# Patient Record
Sex: FEMALE | Race: ASIAN | Employment: STUDENT | URBAN - METROPOLITAN AREA
[De-identification: names, ages, dates, MRNs, and addresses within clinical notes are randomized per-mention and may not be internally consistent; named-entity substitution may affect disease eponyms.]

---

## 2017-05-16 ENCOUNTER — HOSPITAL ENCOUNTER (INPATIENT)
Facility: CLINIC | Age: 22
LOS: 2 days | Discharge: HOME | DRG: 885 | End: 2017-05-18
Attending: PSYCHIATRY & NEUROLOGY | Admitting: PSYCHIATRY & NEUROLOGY
Payer: COMMERCIAL

## 2017-05-16 DIAGNOSIS — F33.1 MAJOR DEPRESSIVE DISORDER, RECURRENT EPISODE, MODERATE (H): ICD-10-CM

## 2017-05-16 DIAGNOSIS — F60.3 BORDERLINE PERSONALITY DISORDER (H): ICD-10-CM

## 2017-05-16 DIAGNOSIS — T40.2X1A OPIOID OVERDOSE, ACCIDENTAL OR UNINTENTIONAL, INITIAL ENCOUNTER (H): Primary | ICD-10-CM

## 2017-05-16 DIAGNOSIS — F43.10 PTSD (POST-TRAUMATIC STRESS DISORDER): ICD-10-CM

## 2017-05-16 LAB
AMPHETAMINES UR QL SCN: ABNORMAL
BARBITURATES UR QL: ABNORMAL
BENZODIAZ UR QL: ABNORMAL
CANNABINOIDS UR QL SCN: ABNORMAL
COCAINE UR QL: ABNORMAL
ETHANOL UR QL SCN: ABNORMAL
HCG UR QL: NEGATIVE
OPIATES UR QL SCN: ABNORMAL

## 2017-05-16 PROCEDURE — 12400001 ZZH R&B MH UMMC

## 2017-05-16 PROCEDURE — 99285 EMERGENCY DEPT VISIT HI MDM: CPT | Mod: 25 | Performed by: PSYCHIATRY & NEUROLOGY

## 2017-05-16 PROCEDURE — 25000132 ZZH RX MED GY IP 250 OP 250 PS 637: Performed by: PSYCHIATRY & NEUROLOGY

## 2017-05-16 PROCEDURE — 81025 URINE PREGNANCY TEST: CPT | Performed by: EMERGENCY MEDICINE

## 2017-05-16 PROCEDURE — 80320 DRUG SCREEN QUANTALCOHOLS: CPT | Performed by: EMERGENCY MEDICINE

## 2017-05-16 PROCEDURE — 80307 DRUG TEST PRSMV CHEM ANLYZR: CPT | Performed by: EMERGENCY MEDICINE

## 2017-05-16 PROCEDURE — 99285 EMERGENCY DEPT VISIT HI MDM: CPT | Mod: Z6 | Performed by: PSYCHIATRY & NEUROLOGY

## 2017-05-16 RX ORDER — OXYBUTYNIN CHLORIDE 5 MG/1
5 TABLET, EXTENDED RELEASE ORAL AT BEDTIME
Status: DISCONTINUED | OUTPATIENT
Start: 2017-05-16 | End: 2017-05-18 | Stop reason: HOSPADM

## 2017-05-16 RX ORDER — IBUPROFEN 200 MG
400-600 TABLET ORAL EVERY 4 HOURS PRN
COMMUNITY
End: 2019-05-04

## 2017-05-16 RX ORDER — IPRATROPIUM BROMIDE AND ALBUTEROL SULFATE 2.5; .5 MG/3ML; MG/3ML
3 SOLUTION RESPIRATORY (INHALATION) ONCE
Status: DISCONTINUED | OUTPATIENT
Start: 2017-05-16 | End: 2017-05-16 | Stop reason: CLARIF

## 2017-05-16 RX ORDER — NICOTINE 21 MG/24HR
1 PATCH, TRANSDERMAL 24 HOURS TRANSDERMAL DAILY
Status: DISCONTINUED | OUTPATIENT
Start: 2017-05-17 | End: 2017-05-18 | Stop reason: HOSPADM

## 2017-05-16 RX ORDER — BISACODYL 10 MG
10 SUPPOSITORY, RECTAL RECTAL DAILY PRN
Status: DISCONTINUED | OUTPATIENT
Start: 2017-05-16 | End: 2017-05-18 | Stop reason: HOSPADM

## 2017-05-16 RX ORDER — ALBUTEROL SULFATE 90 UG/1
2 AEROSOL, METERED RESPIRATORY (INHALATION) DAILY
Status: DISCONTINUED | OUTPATIENT
Start: 2017-05-17 | End: 2017-05-18 | Stop reason: HOSPADM

## 2017-05-16 RX ORDER — IBUPROFEN 200 MG
400-600 TABLET ORAL EVERY 4 HOURS PRN
Status: DISCONTINUED | OUTPATIENT
Start: 2017-05-16 | End: 2017-05-18 | Stop reason: HOSPADM

## 2017-05-16 RX ORDER — OLANZAPINE 5 MG/1
5-10 TABLET ORAL
Status: DISCONTINUED | OUTPATIENT
Start: 2017-05-16 | End: 2017-05-18 | Stop reason: HOSPADM

## 2017-05-16 RX ORDER — OLANZAPINE 10 MG/2ML
5-10 INJECTION, POWDER, FOR SOLUTION INTRAMUSCULAR
Status: DISCONTINUED | OUTPATIENT
Start: 2017-05-16 | End: 2017-05-18 | Stop reason: HOSPADM

## 2017-05-16 RX ORDER — ALUMINA, MAGNESIA, AND SIMETHICONE 2400; 2400; 240 MG/30ML; MG/30ML; MG/30ML
30 SUSPENSION ORAL EVERY 4 HOURS PRN
Status: DISCONTINUED | OUTPATIENT
Start: 2017-05-16 | End: 2017-05-18 | Stop reason: HOSPADM

## 2017-05-16 RX ORDER — HYDROXYZINE HYDROCHLORIDE 25 MG/1
25-50 TABLET, FILM COATED ORAL EVERY 4 HOURS PRN
Status: DISCONTINUED | OUTPATIENT
Start: 2017-05-16 | End: 2017-05-18 | Stop reason: HOSPADM

## 2017-05-16 RX ORDER — TRAZODONE HYDROCHLORIDE 50 MG/1
50 TABLET, FILM COATED ORAL
Status: DISCONTINUED | OUTPATIENT
Start: 2017-05-16 | End: 2017-05-18 | Stop reason: HOSPADM

## 2017-05-16 RX ADMIN — OXYBUTYNIN CHLORIDE 5 MG: 5 TABLET, EXTENDED RELEASE ORAL at 21:36

## 2017-05-16 RX ADMIN — TRAZODONE HYDROCHLORIDE 50 MG: 50 TABLET ORAL at 21:36

## 2017-05-16 ASSESSMENT — ENCOUNTER SYMPTOMS
HALLUCINATIONS: 0
DYSPHORIC MOOD: 1
NERVOUS/ANXIOUS: 1
SHORTNESS OF BREATH: 0
FEVER: 0
BACK PAIN: 0
CHEST TIGHTNESS: 0
ABDOMINAL PAIN: 0
DIZZINESS: 0

## 2017-05-16 ASSESSMENT — ACTIVITIES OF DAILY LIVING (ADL)
COGNITION: 0 - NO COGNITION ISSUES REPORTED
BATHING: 0-->INDEPENDENT
TRANSFERRING: 0-->INDEPENDENT
RETIRED_COMMUNICATION: 0-->UNDERSTANDS/COMMUNICATES WITHOUT DIFFICULTY
RETIRED_EATING: 0-->INDEPENDENT
DRESS: 0-->INDEPENDENT
AMBULATION: 0-->INDEPENDENT
TOILETING: 0-->INDEPENDENT
SWALLOWING: 0-->SWALLOWS FOODS/LIQUIDS WITHOUT DIFFICULTY
FALL_HISTORY_WITHIN_LAST_SIX_MONTHS: NO

## 2017-05-16 NOTE — IP AVS SNAPSHOT
[facility]    [facility]    [address] [facility] [address]    Phone:  [phone]                                       After Visit Summary   5/16/2017    Lily San    MRN: [id number]           After Visit Summary Signature Page     I have received my discharge instructions, and my questions have been answered. I have discussed any challenges I see with this plan with the nurse or doctor.    ..........................................................................................................................................  Patient/Patient Representative Signature      ..........................................................................................................................................  Patient Representative Print Name and Relationship to Patient    ..................................................               ................................................  Date                                            Time    ..........................................................................................................................................  Reviewed by Signature/Title    ...................................................              ..............................................  Date                                                            Time

## 2017-05-16 NOTE — IP AVS SNAPSHOT
MRN:[id number]                      After Visit Summary   5/16/2017    Lily San    MRN: [id number]           Patient Information     Date Of Birth          1995        Designated Caregiver       Most Recent Value    Caregiver    Will someone help with your care after discharge? yes    Name of designated caregiver Ed    Phone number of caregiver look in chart    Caregiver address Look in chart      About your hospital stay     You were admitted on:  May 16, 2017 You last received care in the:  [facility]    You were discharged on:  May 18, 2017       Who to Call     For medical emergencies, please call 911.  For non-urgent questions about your medical care, please call your primary care provider or clinic, [phone]          Attending Provider     Provider Specialty    Adrian Stiles MD Emergency Medicine    [facility]       Primary Care Provider Office Phone # Fax #    [facility] [phone]       [address] [place] [address]  [facility] [address]        Further instructions from your care team       Behavioral Discharge Planning and Instructions      Summary:  You were admitted on 5/16/2017  For Suicidal Ideations.  You were treated by Debra Naegele, APRN, CNS and discharged on 05/18/2017 from Station [facility].    Main Diagnosis:   1. Major depressive disorder, recurrent, moderate.   2. Borderline personality disorder.   3. Opioid use disorder severe.   4. Cannabis use disorder, severe.   5. Posttraumatic stress disorder.   6. Bulimia nervosa in remission  7. Urinary incontinence, unspecified type    Health Care Follow-up Appointments:   Medication Management  Provider:  [facility]  [address]  [address]  Phone: [phone]  Fax: [phone]  Appointment: Tuesday, June 6 at 2:30 pm    No therapy appointment was set up because you  declined therapy services.    Attend all scheduled appointments with your outpatient providers. Call at least 24 hours in advance if you need to reschedule an appointment to ensure continued access to your outpatient providers.   Major Treatments, Procedures and Findings:  You were provided with: a psychiatric assessment, medication evaluation and/or management and milieu management    Symptoms to Report: feeling more aggressive, increased confusion, losing more sleep, mood getting worse or thoughts of suicide    Early warning signs can include: increased depression or anxiety sleep disturbances increased thoughts or behaviors of suicide or self-harm  increased unusual thinking, such as paranoia or hearing voices    Safety and Wellness:  Take all medicines as directed.  Make no changes unless your doctor suggests them. Follow treatment recommendations.  Refrain from alcohol and non-prescribed drugs.  If there is a concern for safety, call [phone].    Resources:   Crisis Intervention: 978.164.7568 or 753-883-5829 (TTY: 560.116.2731).  Call anytime for help.  National Star on Mental Illness (www.[place].zuleyka.org): 814.751.9289 or 915-610-6901.  [place] Association for Children's Mental Health (www.macmh.org): 478.942.5814.  Suicide Awareness Voices of Education (SAVE) (www.save.org): 352-118-RGTX (9573)  National Suicide Prevention Line (www.mentalhealthmn.org): 919-566-WVAQ (1537)  Mental Health Consumer/Survivor Network of [place] (www.mhcsn.net): 506.387.6873 or 969-658-8075  Mental Health Association of [place] (www.mentalhealth.org): 222.751.9970 or 061-883-4989    The treatment team has appreciated the opportunity to work with you.     If you have any questions or concerns our unit number is [phone]  You may be receiving a follow-up phone call within the next three days from a representative from behavioral health.    You have identified the best phone number to reach you as [phone]        Pending Results     No orders  "found from 2017 to 2017.            Admission Information     Date & Time Department Dept. Phone    2017 [facility] [phone]      Your Vitals Were     Blood Pressure Pulse Temperature Respirations Weight Last Period    117/68 78 98.2  F (36.8  C) (Oral) 16 67.4 kg (148 lb 9.6 oz) 2017    Pulse Oximetry BMI (Body Mass Index)                95% 28.08 kg/m2          [employer] Information     [employer] lets you send messages to your doctor, view your test results, renew your prescriptions, schedule appointments and more. To sign up, go to [email or web address] . Click on \"Log in\" on the left side of the screen, which will take you to the Welcome page. Then click on \"Sign up Now\" on the right side of the page.     You will be asked to enter the access code listed below, as well as some personal information. Please follow the directions to create your username and password.     Your access code is: [email or web address]  Expires: 2017  1:47 PM     Your access code will  in 90 days. If you need help or a new code, please call your [facility] clinic or [phone].        Care EveryWhere ID     This is your Care EveryWhere ID. This could be used by other organizations to access your [facility] medical records  [id number]           Review of your medicines      START taking        Dose / Directions    naloxone nasal spray   Commonly known as:  NARCAN   Used for:  Opioid overdose, accidental or unintentional, initial encounter        Dose:  4 mg   Spray 1 spray (4 mg) into one nostril alternating nostrils as needed for opioid reversal (every 2-3 minutes until medical assistance arrives.)   Quantity:  0.2 mL   Refills:  1         CONTINUE these medicines which may have CHANGED, or have new prescriptions. If we are uncertain of the size of tablets/capsules you have at home, strength may be listed as something that might have changed.        Dose / Directions    methadone 5 MG/5ML " solution   Commonly known as:  DOLPHINE   This may have changed:  how much to take   Used for:  Opioid dependence with withdrawal (H)        Dose:  90 mg   Take 90 mLs (90 mg) by mouth daily   Refills:  0       oxybutynin 5 MG 24 hr tablet   Commonly known as:  DITROPAN-XL   This may have changed:  when to take this   Used for:  Urinary incontinence, unspecified incontinence type        Dose:  5 mg   Take 1 tablet (5 mg) by mouth At Bedtime   Quantity:  30 tablet   Refills:  1       risperiDONE 1 MG tablet   Commonly known as:  risperDAL   This may have changed:  when to take this   Used for:  Major depressive disorder, recurrent episode, moderate (H)        Dose:  1 mg   Take 1 tablet (1 mg) by mouth At Bedtime   Quantity:  60 tablet   Refills:  1         CONTINUE these medicines which have NOT CHANGED        Dose / Directions    albuterol 108 (90 BASE) MCG/ACT Inhaler   Commonly known as:  PROAIR HFA/PROVENTIL HFA/VENTOLIN HFA   Used for:  Intermittent asthma, with acute exacerbation        Dose:  2 puff   Inhale 2 puffs into the lungs every 6 hours as needed for shortness of breath / dyspnea or wheezing   Quantity:  1 Inhaler   Refills:  0       DULoxetine 30 MG EC capsule   Commonly known as:  CYMBALTA   Used for:  Major depressive disorder, recurrent episode, moderate (H)        Dose:  30 mg   Take 1 capsule (30 mg) by mouth 2 times daily   Quantity:  60 capsule   Refills:  1       fluticasone 100 MCG/BLIST Aepb   Commonly known as:  FLOVENT DISKUS   Used for:  Intermittent asthma, well controlled        Dose:  1 puff   Inhale 1 puff (100 mcg) into the lungs 2 times daily   Quantity:  60 each   Refills:  1       ibuprofen 200 MG tablet   Commonly known as:  ADVIL/MOTRIN        Dose:  400-600 mg   Take 400-600 mg by mouth every 4 hours as needed for mild pain   Refills:  0       traZODone 100 MG tablet   Commonly known as:  DESYREL   Used for:  Major depressive disorder, recurrent episode, moderate (H)         Dose:  200 mg   Take 2 tablets (200 mg) by mouth At Bedtime   Quantity:  60 tablet   Refills:  1            Where to get your medicines      These medications were sent to [facility] [place], [place] - [address]  [address], [facility] [address]     Phone:  [phone]     DULoxetine 30 MG EC capsule    risperiDONE 1 MG tablet         Some of these will need a paper prescription and others can be bought over the counter. Ask your nurse if you have questions.     Bring a paper prescription for each of these medications     naloxone nasal spray                Protect others around you: Learn how to safely use, store and throw away your medicines at www.disposemymeds.org.             Medication List: This is a list of all your medications and when to take them. Check marks below indicate your daily home schedule. Keep this list as a reference.      Medications           Morning Afternoon Evening Bedtime As Needed    albuterol 108 (90 BASE) MCG/ACT Inhaler   Commonly known as:  PROAIR HFA/PROVENTIL HFA/VENTOLIN HFA   Inhale 2 puffs into the lungs every 6 hours as needed for shortness of breath / dyspnea or wheezing   Last time this was given:  2 puffs on 5/18/2017  9:14 AM                                DULoxetine 30 MG EC capsule   Commonly known as:  CYMBALTA   Take 1 capsule (30 mg) by mouth 2 times daily   Last time this was given:  30 mg on 5/18/2017  9:13 AM                                fluticasone 100 MCG/BLIST Aepb   Commonly known as:  FLOVENT DISKUS   Inhale 1 puff (100 mcg) into the lungs 2 times daily                                ibuprofen 200 MG tablet   Commonly known as:  ADVIL/MOTRIN   Take 400-600 mg by mouth every 4 hours as needed for mild pain                                methadone 5 MG/5ML solution   Commonly known as:  DOLPHINE   Take 90 mLs (90 mg) by mouth daily                                naloxone nasal spray   Commonly known as:  NARCAN   Spray 1  spray (4 mg) into one nostril alternating nostrils as needed for opioid reversal (every 2-3 minutes until medical assistance arrives.)                                oxybutynin 5 MG 24 hr tablet   Commonly known as:  DITROPAN-XL   Take 1 tablet (5 mg) by mouth At Bedtime   Last time this was given:  5 mg on 5/17/2017 10:03 PM                                risperiDONE 1 MG tablet   Commonly known as:  risperDAL   Take 1 tablet (1 mg) by mouth At Bedtime   Last time this was given:  1 mg on 5/17/2017 10:03 PM                                traZODone 100 MG tablet   Commonly known as:  DESYREL   Take 2 tablets (200 mg) by mouth At Bedtime   Last time this was given:  200 mg on 5/17/2017 10:03 PM

## 2017-05-16 NOTE — ED NOTES
Has had increasing suicidal thoughts over the past week.  Feels she may nt be able to keep herself safe.  Today, had plan to use carbon monoxide.  Also reports increased thoughts of self harm by cutting.  Has not acted on these thoughts.

## 2017-05-16 NOTE — ED PROVIDER NOTES
History     Chief Complaint   Patient presents with     Suicidal     increase in past week off and on.       The history is provided by the patient, medical records and a parent.     Lily San is a 21 year old female who comes in due to her worsening suicidal thoughts.  She admits that she has suicidal thoughts all the time but over the last week they have become stronger and more intense.  She no longer feels she can be safe.  She has thoughts of carbon monoxide poisoning.  She has done this in the past.  She has had several hospitalization (up to 20).  She has been in DBT and day treatment in the past but nothing current other than a medication provider.  She has stopped her medications the last week.  She could not give a reason for why she stopped them. She is also using daily marijuana in order to stop the cravings for heroin.  Her last heroin was 2 weeks ago.      I have reviewed the Medications, Allergies, Past Medical and Surgical History, and Social History in the Epic system.    Review of Systems   Constitutional: Negative for fever.   Eyes: Negative for visual disturbance.   Respiratory: Negative for chest tightness and shortness of breath.    Cardiovascular: Negative for chest pain.   Gastrointestinal: Negative for abdominal pain.   Musculoskeletal: Negative for back pain.   Neurological: Negative for dizziness.   Psychiatric/Behavioral: Positive for dysphoric mood and suicidal ideas. Negative for hallucinations and self-injury. The patient is nervous/anxious.    All other systems reviewed and are negative.      Physical Exam   BP: 127/55  Pulse: 74  Temp: 97.6  F (36.4  C)  Resp: 16  SpO2: 95 %  Physical Exam   Constitutional: She is oriented to person, place, and time. She appears well-developed and well-nourished.   HENT:   Head: Normocephalic and atraumatic.   Mouth/Throat: Oropharynx is clear and moist.   Eyes: Pupils are equal, round, and reactive to light.   Neck: Normal range of  motion. Neck supple.   Cardiovascular: Normal rate, regular rhythm and normal heart sounds.    Pulmonary/Chest: Effort normal and breath sounds normal.   Abdominal: Soft. Bowel sounds are normal.   Musculoskeletal: Normal range of motion.   Neurological: She is alert and oriented to person, place, and time.   Skin: Skin is warm and dry.   Psychiatric: Her speech is normal and behavior is normal. She is not actively hallucinating. Thought content is not paranoid and not delusional. Cognition and memory are normal. She expresses impulsivity and inappropriate judgment. She exhibits a depressed mood. She expresses suicidal ideation. She expresses no homicidal ideation. She expresses suicidal plans. She expresses no homicidal plans.   Lily is a 20 y/o female who looks her age.  She is well groomed with good eye contact.   Nursing note and vitals reviewed.      ED Course     ED Course     Procedures               Labs Ordered and Resulted from Time of ED Arrival Up to the Time of Departure from the ED   DRUG ABUSE SCREEN 6 CHEM DEP URINE ([facility])   HCG QUALITATIVE URINE            Assessments & Plan (with Medical Decision Making)   Lily will be admitted to the hospital due to her not taking her meds, using marijuana, having suicidal thoughts with a plan with past attempts and not feeling she can stay safe.  She will go to station 4a under Dr. Mar.    I have reviewed the nursing notes.    I have reviewed the findings, diagnosis, plan and need for follow up with the patient.    New Prescriptions    No medications on file       Final diagnoses:   Borderline personality disorder   PTSD (post-traumatic stress disorder)   Major depressive disorder, recurrent episode, moderate (H)       5/16/2017   [facility], EMERGENCY DEPARTMENT     Adrian Stiles MD  05/16/17 3204

## 2017-05-16 NOTE — PHARMACY-ADMISSION MEDICATION HISTORY
Admission Medication History status for the 5/16/2017 admission is complete.  See EPIC admission navigator for Prior to Admission medications.    Medication history sources:  Patient, Patient's mother/legal guardian     Medication history source reliability: Good    Medication adherence:  Poor    Changes made to PTA medication list (reason)  Added: Ibuprofen  Deleted: None  Changed: Oxybutynin (from taking once at bedtime to in the morning, per patient), methadone from 90 mg daily to 96 mg daily, Risperdal 1 mg BID to 1 mg daily    Additional medication history information (including reliability of information, actions taken by pharmacist):   -Patient was a good historian and responded to questions coherently without any difficulty.  -Patient has not taken any of her medications, except for methadone, within the past 1-2 weeks for no apparent reason.  -Patient states she takes methadone 96 mg once daily and took her last dose today (5/16) around 12:20PM. She also says [facility] manages her methadone regimen. I was unable to confirm this dose with the clinic (tel. # [phone]) due to the clinic being closed.    Time spent in this activity: 20 minutes    Medication history completed by: ALVARO Juárez Pharmacy Intern    Prior to Admission medications    Medication Sig Last Dose Taking? Auth Provider   ibuprofen (ADVIL/MOTRIN) 200 MG tablet Take 400-600 mg by mouth every 4 hours as needed for mild pain Past Month at Unknown time Yes Unknown, Entered By History   methadone (DOLPHINE) 5 MG/5ML solution Take 90 mLs (90 mg) by mouth daily  Patient taking differently: Take 96 mg by mouth daily  5/16/2017 at 1200 Yes Benedict Mar MD   fluticasone (FLOVENT DISKUS) 100 MCG/BLIST AEPB Inhale 1 puff (100 mcg) into the lungs 2 times daily Past Month at Unknown time Yes Benedict Mar MD   DULoxetine (CYMBALTA) 30 MG capsule Take 1 capsule (30 mg) by mouth 2  times daily Past Week at Unknown time Yes Benedict Mar MD   traZODone (DESYREL) 100 MG tablet Take 2 tablets (200 mg) by mouth At Bedtime Past Month at Unknown time Yes Benedict Mar MD   risperiDONE (RISPERDAL) 1 MG tablet Take 1 tablet (1 mg) by mouth 2 times daily  Patient taking differently: Take 1 mg by mouth daily  Past Week at Unknown time Yes Benedict Mar MD   oxybutynin (DITROPAN-XL) 5 MG 24 hr tablet Take 1 tablet (5 mg) by mouth At Bedtime  Patient taking differently: Take 5 mg by mouth every morning  Past Month at Unknown time Yes Benedict Mar MD   albuterol (PROAIR HFA, PROVENTIL HFA, VENTOLIN HFA) 108 (90 BASE) MCG/ACT inhaler Inhale 2 puffs into the lungs every 6 hours as needed for shortness of breath / dyspnea or wheezing Past Week at Unknown time Yes Александр Florentino MD

## 2017-05-17 LAB
ALBUMIN SERPL-MCNC: 4 G/DL (ref 3.4–5)
ALP SERPL-CCNC: 108 U/L (ref 40–150)
ALT SERPL W P-5'-P-CCNC: 28 U/L (ref 0–50)
ANION GAP SERPL CALCULATED.3IONS-SCNC: 8 MMOL/L (ref 3–14)
AST SERPL W P-5'-P-CCNC: 22 U/L (ref 0–45)
BILIRUB SERPL-MCNC: 0.4 MG/DL (ref 0.2–1.3)
BUN SERPL-MCNC: 8 MG/DL (ref 7–30)
CALCIUM SERPL-MCNC: 9.3 MG/DL (ref 8.5–10.1)
CHLORIDE SERPL-SCNC: 107 MMOL/L (ref 94–109)
CHOLEST SERPL-MCNC: 185 MG/DL
CO2 SERPL-SCNC: 26 MMOL/L (ref 20–32)
CREAT SERPL-MCNC: 0.79 MG/DL (ref 0.52–1.04)
ERYTHROCYTE [DISTWIDTH] IN BLOOD BY AUTOMATED COUNT: 12.2 % (ref 10–15)
GFR SERPL CREATININE-BSD FRML MDRD: NORMAL ML/MIN/1.7M2
GLUCOSE SERPL-MCNC: 95 MG/DL (ref 70–99)
HCT VFR BLD AUTO: 44.3 % (ref 35–47)
HDLC SERPL-MCNC: 43 MG/DL
HGB BLD-MCNC: 15.1 G/DL (ref 11.7–15.7)
LDLC SERPL CALC-MCNC: 102 MG/DL
MCH RBC QN AUTO: 30.6 PG (ref 26.5–33)
MCHC RBC AUTO-ENTMCNC: 34.1 G/DL (ref 31.5–36.5)
MCV RBC AUTO: 90 FL (ref 78–100)
NONHDLC SERPL-MCNC: 142 MG/DL
PLATELET # BLD AUTO: 326 10E9/L (ref 150–450)
POTASSIUM SERPL-SCNC: 3.8 MMOL/L (ref 3.4–5.3)
PROT SERPL-MCNC: 7.8 G/DL (ref 6.8–8.8)
RBC # BLD AUTO: 4.94 10E12/L (ref 3.8–5.2)
SODIUM SERPL-SCNC: 141 MMOL/L (ref 133–144)
TRIGL SERPL-MCNC: 202 MG/DL
TSH SERPL DL<=0.005 MIU/L-ACNC: 3.24 MU/L (ref 0.4–4)
WBC # BLD AUTO: 7.3 10E9/L (ref 4–11)

## 2017-05-17 PROCEDURE — 36415 COLL VENOUS BLD VENIPUNCTURE: CPT | Performed by: PSYCHIATRY & NEUROLOGY

## 2017-05-17 PROCEDURE — 25000125 ZZHC RX 250: Performed by: CLINICAL NURSE SPECIALIST

## 2017-05-17 PROCEDURE — 85027 COMPLETE CBC AUTOMATED: CPT | Performed by: PSYCHIATRY & NEUROLOGY

## 2017-05-17 PROCEDURE — 25000132 ZZH RX MED GY IP 250 OP 250 PS 637: Performed by: CLINICAL NURSE SPECIALIST

## 2017-05-17 PROCEDURE — 90853 GROUP PSYCHOTHERAPY: CPT

## 2017-05-17 PROCEDURE — 80053 COMPREHEN METABOLIC PANEL: CPT | Performed by: PSYCHIATRY & NEUROLOGY

## 2017-05-17 PROCEDURE — 25000132 ZZH RX MED GY IP 250 OP 250 PS 637: Performed by: PSYCHIATRY & NEUROLOGY

## 2017-05-17 PROCEDURE — 84443 ASSAY THYROID STIM HORMONE: CPT | Performed by: PSYCHIATRY & NEUROLOGY

## 2017-05-17 PROCEDURE — 80061 LIPID PANEL: CPT | Performed by: PSYCHIATRY & NEUROLOGY

## 2017-05-17 PROCEDURE — 97150 GROUP THERAPEUTIC PROCEDURES: CPT | Mod: GO

## 2017-05-17 PROCEDURE — 12400001 ZZH R&B MH UMMC

## 2017-05-17 PROCEDURE — 99222 1ST HOSP IP/OBS MODERATE 55: CPT | Mod: AI | Performed by: CLINICAL NURSE SPECIALIST

## 2017-05-17 RX ORDER — METHADONE HYDROCHLORIDE 10 MG/ML
96 CONCENTRATE ORAL DAILY
Status: DISCONTINUED | OUTPATIENT
Start: 2017-05-17 | End: 2017-05-18 | Stop reason: HOSPADM

## 2017-05-17 RX ORDER — RISPERIDONE 1 MG/1
1 TABLET ORAL AT BEDTIME
Status: DISCONTINUED | OUTPATIENT
Start: 2017-05-17 | End: 2017-05-18 | Stop reason: HOSPADM

## 2017-05-17 RX ORDER — METHADONE HYDROCHLORIDE 5 MG/5ML
96 SOLUTION ORAL ONCE
Status: DISCONTINUED | OUTPATIENT
Start: 2017-05-17 | End: 2017-05-17 | Stop reason: CLARIF

## 2017-05-17 RX ORDER — TRAZODONE HYDROCHLORIDE 100 MG/1
200 TABLET ORAL AT BEDTIME
Status: DISCONTINUED | OUTPATIENT
Start: 2017-05-17 | End: 2017-05-18 | Stop reason: HOSPADM

## 2017-05-17 RX ORDER — NALOXONE HYDROCHLORIDE 0.4 MG/ML
.1-.4 INJECTION, SOLUTION INTRAMUSCULAR; INTRAVENOUS; SUBCUTANEOUS
Status: DISCONTINUED | OUTPATIENT
Start: 2017-05-17 | End: 2017-05-18 | Stop reason: HOSPADM

## 2017-05-17 RX ORDER — METHADONE HYDROCHLORIDE 5 MG/5ML
96 SOLUTION ORAL DAILY
Status: DISCONTINUED | OUTPATIENT
Start: 2017-05-18 | End: 2017-05-17 | Stop reason: CLARIF

## 2017-05-17 RX ORDER — ONDANSETRON 4 MG/1
4 TABLET, ORALLY DISINTEGRATING ORAL EVERY 6 HOURS PRN
Status: DISCONTINUED | OUTPATIENT
Start: 2017-05-17 | End: 2017-05-18 | Stop reason: HOSPADM

## 2017-05-17 RX ORDER — DULOXETIN HYDROCHLORIDE 30 MG/1
30 CAPSULE, DELAYED RELEASE ORAL 2 TIMES DAILY
Status: DISCONTINUED | OUTPATIENT
Start: 2017-05-17 | End: 2017-05-18 | Stop reason: HOSPADM

## 2017-05-17 RX ADMIN — HYDROXYZINE HYDROCHLORIDE 50 MG: 25 TABLET ORAL at 12:25

## 2017-05-17 RX ADMIN — ALBUTEROL SULFATE 2 PUFF: 90 AEROSOL, METERED RESPIRATORY (INHALATION) at 11:35

## 2017-05-17 RX ADMIN — OXYBUTYNIN CHLORIDE 5 MG: 5 TABLET, EXTENDED RELEASE ORAL at 22:03

## 2017-05-17 RX ADMIN — RISPERIDONE 1 MG: 1 TABLET ORAL at 22:03

## 2017-05-17 RX ADMIN — DULOXETINE 30 MG: 30 CAPSULE, DELAYED RELEASE ORAL at 22:03

## 2017-05-17 RX ADMIN — METHADONE HYDROCHLORIDE 96 MG: 10 CONCENTRATE ORAL at 13:48

## 2017-05-17 RX ADMIN — TRAZODONE HYDROCHLORIDE 200 MG: 100 TABLET ORAL at 22:03

## 2017-05-17 RX ADMIN — ONDANSETRON 4 MG: 4 TABLET, ORALLY DISINTEGRATING ORAL at 11:34

## 2017-05-17 RX ADMIN — NICOTINE 1 PATCH: 14 PATCH TRANSDERMAL at 11:36

## 2017-05-17 ASSESSMENT — ACTIVITIES OF DAILY LIVING (ADL)
HYGIENE/GROOMING: INDEPENDENT
ORAL_HYGIENE: INDEPENDENT
HYGIENE/GROOMING: INDEPENDENT
ORAL_HYGIENE: INDEPENDENT
DRESS: INDEPENDENT
DRESS: INDEPENDENT
LAUNDRY: UNABLE TO COMPLETE

## 2017-05-17 NOTE — PLAN OF CARE
Problem: General Plan of Care (Inpatient Behavioral)  Goal: Individualization/Patient Specific Goal (IP Behavioral)  The patient and/or their representative will achieve their patient-specific goals related to the plan of care.    The patient-specific goals include: Illness Management Recovery model: Objectives  Patient will identify reason(s) for hospitalization from their perspective.  Patient will identify a minimum of three goals for discharge.  Patient will identify a minimum of three triggers that may increase their symptoms.  Patient will identify a minimum of three coping skills they can do to stay well.   Patient will identify their support system to demonstrate readiness for discharge.    Illness Management & Recovery assists patient to develop relapse prevention as  patient identifies triggers for relapse.  patient identifies a general wellness strategy.  patient identifies the warning signs that they are in danger of relapse.  patient identifies someone they count on to get feedback .  patient identifies ways to take action when in danger of relapse.  patient identifies way to cope with stress or other symptoms.   patient participates in self-reflection.   The patient completed the reasons for admit and goals for discharge in the personal plan of care.   Reasons for admit:  1)  Suicidal ideation  2)  Not taking meds  3)  Mental health  4)  Feeling unsafe     Goals for discharge:  1)  Stabilize back on meds  2)  Feel better and not have suicidal ideation  3)  Be honest about how I'm doing

## 2017-05-17 NOTE — PROGRESS NOTES
Pt was awake for vitals and breakfast but complained of feeling sick to her stomach.  Pt vomited multiple times though out the shift.  Pt's nurse was informed and followed up immediatly.      Pt did attempt to eat breakfast but continued to feel ill.       05/17/17 1200   Behavioral Health   Hallucinations denies / not responding to hallucinations   Thinking distractable   Orientation person: oriented;place: oriented;date: oriented   Memory baseline memory   Insight admits / accepts   Judgement impaired   Eye Contact at examiner   Affect blunted, flat   Mood mood is calm   Physical Appearance/Attire attire appropriate to age and situation   Suicidality (none stated nor observed)   Self Injury (none stated nor observed)   Speech clear;coherent   Psychomotor / Gait balanced;steady   Activities of Daily Living   Hygiene/Grooming independent   Oral Hygiene independent   Dress independent   Room Organization independent

## 2017-05-17 NOTE — PROGRESS NOTES
Attendence: Pt. Attended scheduled 3 of 3 OT sessions today.   Observations: Pt had positive participation and contributed to group discussion.      05/17/17 1500   Occupational Therapy   Type of Intervention structured groups   Response Participates   Hours 2.5

## 2017-05-17 NOTE — PROGRESS NOTES
Patient arrived to the unit escorted by security and staff.  Two female nurses searched the patient. Patient personal items were placed in the back, pending safety search.  After search was completed, the patients mother was let in to visit. PAULINA signed for mom. Folder is reviewed with patient and parent.   Plan is to complete admission after visiting hours.
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
## 2017-05-17 NOTE — PROGRESS NOTES
Pt reporting nausea this morning otherwise denied any pain or anxiety early am, after meeting with primary clinician, pt had Methadone maintenance dose of 96 mg verified with [facility] in [place]. Pt very diapharetic, reporting anxiety swallows 1/2 bottle of methadone and immediately vomits repeatedly large amounts of fluid. Pt accepting of reassurance and assist with hygiene and clothing change. zofran and hydroxyzine prn given for nausea and anxiety, Fransisco ARMSTRONG consulted and informed of pt request for tablet and recommends the highly concentrated solution. Pt informed and agrees with plan, now lying down excused from group.

## 2017-05-17 NOTE — PROGRESS NOTES
Initial Psychosocial Assessment    I have reviewed the chart, met with the patient, and developed Care Plan.  Information for assessment was obtained from:       Presenting Problem:  Patient is a 21 year old female who comes in due to her worsening suicidal thoughts. She admits that she has suicidal thoughts all the time but over the last week they have become stronger and more intense. She no longer feels she can be safe. She has thoughts of carbon monoxide poisoning which has done this in the past.   She stopped her medications the last week. She could not give a reason for why she stopped them. She is also using daily marijuana in order to stop the cravings for heroin. Her last heroin was 2 weeks ago.     History of Mental Health and Chemical Dependency:  Patient has a hx of PTSD, Depression, anxiety and borderline personality diorder. Patient has prior suicide attempts. Pt has a long history of drug abuse stating at age 14. She's been using heroin since age 16. Patient has been through several CD treatments. She has been in DBT and day treatment in the past but nothing current other than a medication provider. She has had several hospitalizations (up to 20). Patient spent a year at [facility] at age 13-14.     Family Description ([place], Family Psychiatric History):  Patient was adopted from [place] at age 10 months to two mothers who have since . She has a younger sister who is also adopted with whom she has lost contact. Patient does not have many memories of her childhood.    Significant Life Events (Illness, Abuse, Trauma, Death):  Patient denies abuse.    Living Situation:  Pt lives with boyfriend in [place]    Educational Background:  Completed HS and one semester of college    Occupational History:  Currently unemployed. Patient has trouble holding a job due to mental health issues.    Financial Status:  Suppoted by boyfriend.    Legal Issues:  None    Ethnic/Cultural Issues:  None    Spiritual  Orientation:  Believes in God     Service History:  None    Social Functioning (organization, interests):  Patient has support from boyfriend, parents and grandma. She enjoys shopping, arts and crafts, music, and walking    Current Treatment Providers are:  Medication management: Abelino FLORES, [facility], [address], [place]  [address], [phone]  CCM: Tamara (Patient does not know last name. This is a new CCM with [facility])  Patient does not want a therapist. She did a lot in the past and did not find it helpful. She has a counselor at the methadone clinic.    Social Service Assessment/Plan:  The plan is to assess the patient for mental health and medication needs. The patient will be prescribed medications to treat the identified symptoms. Patient will participate in therapeutic skill building groups on the unit. CTC to coordinate discharge/aftercare planning.

## 2017-05-17 NOTE — PROGRESS NOTES
05/17/17 0100   Patient Belongings   Did you bring any home meds/supplements to the hospital?  No   Patient Belongings clothing   Disposition of Belongings All items are in patient's storage bin.   Belongings Search Yes  (Philippe conducted belongins search.)   Clothing Search Yes  (greg parra conducted clothing search.)   Second Staff greg parra     *No items sent to security    Items in patient's storage bin: blue jeans; brown sandals; black tank top; white coat;    ADMISSION: I am responsible for any personal items that are not sent to the safe or pharmacy. [facility] is not responsible for loss, theft or damage of any property in my possession.  Patient Signature _____________________ Date/Time _____________________  Staff Signature _______________________ Date/Time _____________________  2nd Staff person, if patient is unable/unwilling to sign  ___________________________________ Date/Time _____________________  DISCHARGE:  My personal items have been returned to me.   Patient Signature _____________________ Date/Time _____________________

## 2017-05-17 NOTE — PLAN OF CARE
Problem: General Plan of Care (Inpatient Behavioral)  Goal: Team Discussion  Team Plan:   BEHAVIORAL TEAM DISCUSSION     Continued Stay Criteria/Rationale: New patient admitted for suicidal ideation  Plan: Psychiatric Assessment. Medication Management. Therapeutic [place]. Individual and group support  Participants: Deb Naegele APRN CNS, Jermaine Rae RN, Nadine Hunt RN, Sandra CAMARGO, Brandon Dia ([facility])  Summary/Recommendation: The plan is to assess the patient for mental health and medication needs.  The patient will be prescribed medications to treat the identified symptoms.  Upon discharge the patient will be referred to services as appropriate based on the assessment.  Medical/Physical: see medical consult  Progress: Initial evaluation

## 2017-05-18 VITALS
OXYGEN SATURATION: 95 % | DIASTOLIC BLOOD PRESSURE: 68 MMHG | HEART RATE: 78 BPM | WEIGHT: 148.6 LBS | BODY MASS INDEX: 28.08 KG/M2 | RESPIRATION RATE: 16 BRPM | TEMPERATURE: 98.2 F | SYSTOLIC BLOOD PRESSURE: 117 MMHG

## 2017-05-18 PROCEDURE — 25000132 ZZH RX MED GY IP 250 OP 250 PS 637: Performed by: PSYCHIATRY & NEUROLOGY

## 2017-05-18 PROCEDURE — 97150 GROUP THERAPEUTIC PROCEDURES: CPT | Mod: GO

## 2017-05-18 PROCEDURE — 99239 HOSP IP/OBS DSCHRG MGMT >30: CPT | Performed by: CLINICAL NURSE SPECIALIST

## 2017-05-18 PROCEDURE — 25000132 ZZH RX MED GY IP 250 OP 250 PS 637: Performed by: CLINICAL NURSE SPECIALIST

## 2017-05-18 RX ORDER — DULOXETIN HYDROCHLORIDE 30 MG/1
30 CAPSULE, DELAYED RELEASE ORAL 2 TIMES DAILY
Qty: 60 CAPSULE | Refills: 1 | Status: SHIPPED | OUTPATIENT
Start: 2017-05-18 | End: 2019-05-04

## 2017-05-18 RX ORDER — RISPERIDONE 1 MG/1
1 TABLET ORAL AT BEDTIME
Qty: 60 TABLET | Refills: 1 | Status: SHIPPED | OUTPATIENT
Start: 2017-05-18 | End: 2019-05-04

## 2017-05-18 RX ADMIN — NICOTINE 1 PATCH: 14 PATCH TRANSDERMAL at 09:13

## 2017-05-18 RX ADMIN — DULOXETINE 30 MG: 30 CAPSULE, DELAYED RELEASE ORAL at 09:13

## 2017-05-18 RX ADMIN — ALBUTEROL SULFATE 2 PUFF: 90 AEROSOL, METERED RESPIRATORY (INHALATION) at 09:14

## 2017-05-18 RX ADMIN — METHADONE HYDROCHLORIDE 96 MG: 10 CONCENTRATE ORAL at 09:15

## 2017-05-18 ASSESSMENT — ACTIVITIES OF DAILY LIVING (ADL)
DRESS: INDEPENDENT
GROOMING: INDEPENDENT
ORAL_HYGIENE: INDEPENDENT

## 2017-05-18 NOTE — PROGRESS NOTES
"   05/17/17 9003   Behavioral Health   Hallucinations denies / not responding to hallucinations   Thinking distractable   Orientation person: oriented;place: oriented;date: oriented;time: oriented   Memory baseline memory   Insight admits / accepts   Judgement impaired   Eye Contact at examiner   Affect blunted, flat   Mood mood is calm   Physical Appearance/Attire attire appropriate to age and situation   Hygiene well groomed   Suicidality other (see comments)  (none stated or observed)   Self Injury other (see comment)  (none stated or observed)   Activity withdrawn;other (see comment)  (in groups)   Speech clear;coherent   Medication Sensitivity no stated side effects;no observed side effects   Psychomotor / Gait balanced;steady   Activities of Daily Living   Hygiene/Grooming independent   Oral Hygiene independent   Dress independent   Laundry unable to complete   Room Organization independent     Pt was active and appropriate in the milieu. During community meeting, however, pt reported feeling \"frustrated\" and stated that she \"just wants to punch someone\" but did not direct this statement towards any specific individual. Pt did not appear irritable during the shift, despite her statement.   "

## 2017-05-18 NOTE — PLAN OF CARE
"Problem: Suicidal Behavior  Goal: Suicidal Behavior is Absent/Minimized/Managed  Patient, while hospitalized, will:  -attend unit programming to develop health coping skills  -be medication compliant   -verbalize an understanding of their medication regimen  -verbalize decrease in depressive signs/symptoms  -will participate in discharge planning  -will seek out staff at urges of self-harm    Patient, prior to dishcharge, will:  -verbalize 2 coping skills that promote mental health   -verbalize absence/decrease of SI/SIB   -develop a safety plan  -will identify a support system     ..TO PROMOTE SAFETY    Patient identified the following  Triggers:  5/18-\"hanging with old using friends, drug use urges, money and concerns with income, My PTSD and paranoid thoughts, arguing or fighting with boyfriend or family and feeling hopeless about things\"    Wellness Strategies:  5/18-\"jarvis medications as prescribed, complete all recommended doctor/therapy appointments, get plenty of sleep, spend time with others a few times per week and get exercise/walk a few times per week\"    Warning Signs:  5/18-\"No taking medications, using drugs and feeling paranoid, poor mood/atitude, changes in sleep and appetite\"    Feedback (people they would like to receive feedback from if early warning signs):  5/18-  Family(s): \"Teodoro, my Lindsey\"    Partner/Spouse: \"Ed, Boyfriend\"    Support Group Member(s): \"Gomez Smith-psychiatrist\"    Taking Action: \"need to go to some meetings regularly and get a sponsor\"    Ways to [place]:\"journal, talk to others about my feelings/thoughts, play with my pets, walk to the Methadone clinic for my maintenance dose daily, drive around and go to a meeting\"    Self-Reflection & Planning.  Assessed patient s progress completing forms related to Illness Management Recovery (including Personal Plan of Care, Adult Coping Plan, and My Support and Coping Plan) and assisted as needed.    Encouraged patient to continue to " "consider triggers, wellness strategies, early warning signs, feedback from others, actions to take to prevent relapse, and coping strategies as part of a plan to remain well after leaving the hospital.  5/18-reviewed with Nadine Hunt 1:25 PM                 Outcome: Adequate for Discharge Date Met:  05/18/17  RN48/     Patient OWS (PCS) monitored (6 @ 0800 and 5 @ 1200) with medications provided as ordered (EMAR) for s/s associated with substance withdrawal.  Patient is hopeful stating \"I am glad to have second chance of recovery\" and stated GOAL is \"to go back to regular meetings and stay sober.\"     Patient rates depression 4/10* c/o Feelings of worthlessness or excessive/inappropriate guilt  Patient rates anxiety at 3/10* c/o Feeling nervous or on edge  Patient describes mood as \"anxious and hopeful.\"     Patient is cooperative and pleasant appearing full range and Appropriate to situation . Patient is med-compliant, is eating 75% and reports \"sleeps through the night\".Patient is attending groups.     Patient denies current or recent suicidal ideation    SIB denies  nonbe observed  HI: denies current or recent homicidal ideation or behaviors.     VS reviewed: /68  Pulse 71  Temp 98.2  F (36.8  C) (Oral)  Resp 16  Wt 67.4 kg (148 lb 9.6 oz)  LMP 05/16/2017  SpO2 95%  BMI 28.08 kg/m2. Patient denies  pain.     Length of stay: 2     Patient evaluation continues. Assessed mood,anxiety,thoughts and behavior and withdrawal process. Patient is progressing towards goals. Patient is encouraged to participate in groups and assisted to develop healthy coping skills.      Refer to daily team meeting notes for individualized plan of care. Nursing will continue to monitor.     * Scale is offered as scale of 1 to 10 with 10 being the worst.           "

## 2017-05-18 NOTE — PROGRESS NOTES
Behavioral Health  Note  Behavioral Health  Spirituality Group Note    Unit 3CW    Name: Lily San    YOB: 1995   MRN: [id number]    Age: 21 year old    Patient attended -led group, which included discussion of spirituality, coping with illness and building resilience.  Patient attended group for 1 hrs.  The patient actively participated in group discussion    Gianna Ding M.S., M.Div.  Staff   Pager [phone]

## 2017-05-18 NOTE — DISCHARGE INSTRUCTIONS
Behavioral Discharge Planning and Instructions      Summary:  You were admitted on 5/16/2017  For Suicidal Ideations.  You were treated by Debra Naegele, APRN, CNS and discharged on 05/18/2017 from Station 4A.    Main Diagnosis:   1. Major depressive disorder, recurrent, moderate.   2. Borderline personality disorder.   3. Opioid use disorder severe.   4. Cannabis use disorder, severe.   5. Posttraumatic stress disorder.   6. Bulimia nervosa in remission  7. Urinary incontinence, unspecified type    Health Care Follow-up Appointments:   Medication Management  Provider:  Darrel Cotch [facility]  [address] [place] [address]  Phone: [phone]  Fax: [phone]  Appointment: Tuesday, June 6 at 2:30 pm    No therapy appointment was set up because you declined therapy services.    Attend all scheduled appointments with your outpatient providers. Call at least 24 hours in advance if you need to reschedule an appointment to ensure continued access to your outpatient providers.   Major Treatments, Procedures and Findings:  You were provided with: a psychiatric assessment, medication evaluation and/or management and milieu management    Symptoms to Report: feeling more aggressive, increased confusion, losing more sleep, mood getting worse or thoughts of suicide    Early warning signs can include: increased depression or anxiety sleep disturbances increased thoughts or behaviors of suicide or self-harm  increased unusual thinking, such as paranoia or hearing voices    Safety and Wellness:  Take all medicines as directed.  Make no changes unless your doctor suggests them. Follow treatment recommendations.  Refrain from alcohol and non-prescribed drugs.  If there is a concern for safety, call 911.    Resources:   Crisis Intervention: 257.664.7320 or 204-583-6957 (TTY: 145.850.9010).  Call anytime for help.  National [place] on Mental Illness (www.[place].org): 185.916.2606  or 480-240-3267.  [place] Association for Children's Mental Health (www.mac.org): 729.200.8885.  Suicide Awareness Voices of Education (SAVE) (www.save.org): 969-316-HERD (4283)  National Suicide Prevention Line (www.mentalhealthmn.org): 758-997-BXBB (8839)  Mental Health Consumer/Survivor Network of [place] (www.mhcsn.net): 209.223.7318 or 601-268-4676  Mental Health Association of [place] (www.mentalhealth.org): 953.937.5631 or 747-795-6434    The treatment team has appreciated the opportunity to work with you.     If you have any questions or concerns our unit number is [phone]  You may be receiving a follow-up phone call within the next three days from a representative from behavioral health.    You have identified the best phone number to reach you as [phone]

## 2017-05-18 NOTE — PROGRESS NOTES
Patient discharging 5/18/2017 1430 accompanied by boyfriend ED and destination is home.    Discharge paperwork and medications reviewed with patient who verbalizes understanding.     Copies provided: AVS done      Med Rec done Med-30 day supply  Security none   Locker emptied     DISCHARGE FLOW SHEET: done    CARE PLAN COMPLETE: yes    EDUCATION COMPLETE: yes    Illness Management Recovery model: Personal Plan of Care    Patient completed Personal Plan of Care, identifying reasons for hospitalization and goals for discharge. Form reviewed in team meeting  by patient, physician, writer and RN. Form given to [facility] to be scanned into EPIC.    Survey provided.

## 2017-05-19 ENCOUNTER — TELEPHONE (OUTPATIENT)
Dept: FAMILY MEDICINE | Facility: OTHER | Age: 22
End: 2017-05-19

## 2017-05-19 NOTE — TELEPHONE ENCOUNTER
ED / Discharge Outreach Protocol    Patient Contact    Attempt # 1    Was call answered?  No.  Left message on voicemail with information to call me back.    Augustus Caraballo, RN, BSN

## 2017-05-19 NOTE — TELEPHONE ENCOUNTER
Reason for follow up call: Lily Baumimtiaz appeared on our list for being seen in and recenlty discharge from the Hospital.    Chief Complaint   Patient presents with     Hospital F/U     Borderline Personality Disorder, Opioid Overdose, Accidental Or Unintentional, Initial Encounter       Encounter routed for Clinic RN to call for follow up

## 2019-05-04 ENCOUNTER — HOSPITAL ENCOUNTER (INPATIENT)
Facility: CLINIC | Age: 24
LOS: 9 days | Discharge: HOME | DRG: 882 | End: 2019-05-13
Attending: PSYCHIATRY & NEUROLOGY | Admitting: PSYCHIATRY & NEUROLOGY
Payer: COMMERCIAL

## 2019-05-04 DIAGNOSIS — F43.10 PTSD (POST-TRAUMATIC STRESS DISORDER): ICD-10-CM

## 2019-05-04 DIAGNOSIS — R45.851 SUICIDAL IDEATION: ICD-10-CM

## 2019-05-04 DIAGNOSIS — F19.11 HISTORY OF SUBSTANCE ABUSE (H): ICD-10-CM

## 2019-05-04 DIAGNOSIS — F33.2 SEVERE RECURRENT MAJOR DEPRESSION WITHOUT PSYCHOTIC FEATURES (H): ICD-10-CM

## 2019-05-04 LAB
AMPHETAMINES UR QL SCN: NEGATIVE
BARBITURATES UR QL: NEGATIVE
BENZODIAZ UR QL: NEGATIVE
CANNABINOIDS UR QL SCN: NEGATIVE
COCAINE UR QL: NEGATIVE
ETHANOL UR QL SCN: NEGATIVE
HCG UR QL: NEGATIVE
OPIATES UR QL SCN: NEGATIVE

## 2019-05-04 PROCEDURE — 90791 PSYCH DIAGNOSTIC EVALUATION: CPT

## 2019-05-04 PROCEDURE — 99285 EMERGENCY DEPT VISIT HI MDM: CPT | Mod: Z6 | Performed by: PSYCHIATRY & NEUROLOGY

## 2019-05-04 PROCEDURE — 80320 DRUG SCREEN QUANTALCOHOLS: CPT | Performed by: FAMILY MEDICINE

## 2019-05-04 PROCEDURE — 99285 EMERGENCY DEPT VISIT HI MDM: CPT | Mod: 25 | Performed by: PSYCHIATRY & NEUROLOGY

## 2019-05-04 PROCEDURE — 90853 GROUP PSYCHOTHERAPY: CPT

## 2019-05-04 PROCEDURE — 81025 URINE PREGNANCY TEST: CPT | Performed by: FAMILY MEDICINE

## 2019-05-04 PROCEDURE — 80307 DRUG TEST PRSMV CHEM ANLYZR: CPT | Performed by: FAMILY MEDICINE

## 2019-05-04 PROCEDURE — 12400001 ZZH R&B MH UMMC

## 2019-05-04 RX ORDER — BISACODYL 10 MG
10 SUPPOSITORY, RECTAL RECTAL DAILY PRN
Status: DISCONTINUED | OUTPATIENT
Start: 2019-05-04 | End: 2019-05-13 | Stop reason: HOSPADM

## 2019-05-04 RX ORDER — ACETAMINOPHEN 325 MG/1
650 TABLET ORAL EVERY 4 HOURS PRN
Status: DISCONTINUED | OUTPATIENT
Start: 2019-05-04 | End: 2019-05-13 | Stop reason: HOSPADM

## 2019-05-04 RX ORDER — ALUMINA, MAGNESIA, AND SIMETHICONE 2400; 2400; 240 MG/30ML; MG/30ML; MG/30ML
30 SUSPENSION ORAL EVERY 4 HOURS PRN
Status: DISCONTINUED | OUTPATIENT
Start: 2019-05-04 | End: 2019-05-13 | Stop reason: HOSPADM

## 2019-05-04 RX ORDER — TRAZODONE HYDROCHLORIDE 50 MG/1
50 TABLET, FILM COATED ORAL
Status: DISCONTINUED | OUTPATIENT
Start: 2019-05-04 | End: 2019-05-13 | Stop reason: HOSPADM

## 2019-05-04 RX ORDER — OLANZAPINE 10 MG/2ML
10 INJECTION, POWDER, FOR SOLUTION INTRAMUSCULAR
Status: DISCONTINUED | OUTPATIENT
Start: 2019-05-04 | End: 2019-05-13

## 2019-05-04 RX ORDER — CLINDAMYCIN PHOSPHATE 10 MG/G
GEL TOPICAL 2 TIMES DAILY
COMMUNITY

## 2019-05-04 RX ORDER — TRETINOIN 0.25 MG/G
CREAM TOPICAL AT BEDTIME
COMMUNITY

## 2019-05-04 RX ORDER — OLANZAPINE 10 MG/1
10 TABLET ORAL
Status: DISCONTINUED | OUTPATIENT
Start: 2019-05-04 | End: 2019-05-13

## 2019-05-04 RX ORDER — HYDROXYZINE HYDROCHLORIDE 25 MG/1
25 TABLET, FILM COATED ORAL EVERY 4 HOURS PRN
Status: DISCONTINUED | OUTPATIENT
Start: 2019-05-04 | End: 2019-05-13 | Stop reason: HOSPADM

## 2019-05-04 ASSESSMENT — ENCOUNTER SYMPTOMS
CARDIOVASCULAR NEGATIVE: 1
CONSTITUTIONAL NEGATIVE: 1
RESPIRATORY NEGATIVE: 1
MUSCULOSKELETAL NEGATIVE: 1
HYPERACTIVE: 0
NERVOUS/ANXIOUS: 1
EYES NEGATIVE: 1
SLEEP DISTURBANCE: 1
GASTROINTESTINAL NEGATIVE: 1
HALLUCINATIONS: 0
DYSPHORIC MOOD: 1
NEUROLOGICAL NEGATIVE: 1
DECREASED CONCENTRATION: 1

## 2019-05-04 ASSESSMENT — ACTIVITIES OF DAILY LIVING (ADL)
COGNITION: 0 - NO COGNITION ISSUES REPORTED
DRESS: 0-->INDEPENDENT
TRANSFERRING: 0-->INDEPENDENT
ORAL_HYGIENE: INDEPENDENT
DRESS: INDEPENDENT
TOILETING: 0-->INDEPENDENT
HYGIENE/GROOMING: INDEPENDENT
RETIRED_COMMUNICATION: 0-->UNDERSTANDS/COMMUNICATES WITHOUT DIFFICULTY
SWALLOWING: 0-->SWALLOWS FOODS/LIQUIDS WITHOUT DIFFICULTY
AMBULATION: 0-->INDEPENDENT
BATHING: 0-->INDEPENDENT
FALL_HISTORY_WITHIN_LAST_SIX_MONTHS: NO
RETIRED_EATING: 0-->INDEPENDENT

## 2019-05-04 ASSESSMENT — MIFFLIN-ST. JEOR: SCORE: 1138.5
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
## 2019-05-04 NOTE — PHARMACY-ADMISSION MEDICATION HISTORY
Admission Medication History status for the 5/4/2019 admission is complete.  See EPIC admission navigator for Prior to Admission medications.    Medication history sources:  Patient, Patricia Records, January 24, 2019 office visit at [facility]    Medication history source reliability: Good - patient reports taking no medications, no recent fills found on file    Medication adherence:  Moderate - patient not regularly taking any medications, uses face creams PRN    Changes made to PTA medication list (reason)  Added: Clindamycin, Tretinoin (both added from 1/2019 office visit)  Deleted:   Duloxetine 30 mg EC capsule: 1 cap BID (patient reports not taking in over a year, prescription from 5/2017)  Flovent 100 mcg/blist: Inhale 1 puff into lungs twice daily (patient reports not taking, prescription from 1/2016)  Methadone 5 mg/5 mL solution: Take 90 mL by mouth daily (prescription from 1/2016)  Oxybutynin 5 mg/24 hour tablet: Take 1 tab at bedtime (patient reports not taking, prescription from 1/2016)  Risperidone 1 mg tablet: Take 1 tab at bedtime (patient reports not taking, prescription from 5/2017)  Trazodone 100 mg tablet: take 2 tablets (200 mg) by mouth at bedtime (patient reports not taking, prescription from 1/2016)  Changed: None    Additional medication history information (including reliability of information, actions taken by pharmacist):   - Patient was a good historian, she reports not taking any routine medications. She occasionally uses her albuterol inhaler, and has two face creams that she hasn't used in the past 2 weeks.    Time spent in this activity: 20 min    Medication history completed by: Chelsie Richard    Prior to Admission medications    Medication Sig Last Dose Taking? Auth Provider   clindamycin (CLINDAMAX) 1 % external gel Apply topically 2 times daily Past Month at Unknown time Yes Unknown, Entered By History   ibuprofen (ADVIL/MOTRIN) 200 MG tablet Take 400-600 mg by mouth  every 4 hours as needed for mild pain Past Month at Unknown time Yes Unknown, Entered By History   tretinoin (RETIN-A) 0.025 % external cream Apply topically At Bedtime Past Month at Unknown time Yes Unknown, Entered By History   albuterol (PROAIR HFA, PROVENTIL HFA, VENTOLIN HFA) 108 (90 BASE) MCG/ACT inhaler Inhale 2 puffs into the lungs every 6 hours as needed for shortness of breath / dyspnea or wheezing More than a month at Unknown time  Александр Florentino MD   naloxone (NARCAN) nasal spray Spray 1 spray (4 mg) into one nostril alternating nostrils as needed for opioid reversal (every 2-3 minutes until medical assistance arrives.)   Naegele, Debra Ann, APRN CNS

## 2019-05-04 NOTE — PROGRESS NOTES
I have performed an in person assessment of the patient. Based on this assessment the patient no longer requires a one on one attendant at this point in time.    Vikash Long MD  12:22 PM  May 4, 2019
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
## 2019-05-04 NOTE — PROGRESS NOTES
05/04/19 1816   Patient Belongings   Did you bring any home meds/supplements to the hospital?  No   Patient Belongings locker   Patient Belongings Put in Hospital Secure Location (Security or Locker, etc.) shoes;clothing;other (see comments)   Belongings Search Yes     Belongings in pt Bin:  Shoes, socks, box of tampons, 3 books, sandals, comb, case w/glasses and contacts, sweatshirt, button up shirt, yoga pants, jacket, jeans x2, tank top, t-shirt x2, shorts, underwear x5, bras x3, hair ties x3, purple shirt, black pair of leggings.     Pt received shoes from her visitor on 5/7/19    A               Admission:  I am responsible for any personal items that are not sent to the safe or pharmacy.  [facility] is not responsible for loss, theft or damage of any property in my possession.    Signature:  _________________________________ Date: _______  Time: _____                                              Staff Signature:  ____________________________ Date: ________  Time: _____      2nd Staff person, if patient is unable/unwilling to sign:    Signature: ________________________________ Date: ________  Time: _____     Discharge:  [place] has returned all of my personal belongings:    Signature: _________________________________ Date: ________  Time: _____                                          Staff Signature:  ____________________________ Date: ________  Time: _____

## 2019-05-05 LAB
ALBUMIN SERPL-MCNC: 3.6 G/DL (ref 3.4–5)
ALP SERPL-CCNC: 50 U/L (ref 40–150)
ALT SERPL W P-5'-P-CCNC: 29 U/L (ref 0–50)
ANION GAP SERPL CALCULATED.3IONS-SCNC: 6 MMOL/L (ref 3–14)
AST SERPL W P-5'-P-CCNC: 23 U/L (ref 0–45)
BASOPHILS # BLD AUTO: 0 10E9/L (ref 0–0.2)
BASOPHILS NFR BLD AUTO: 0.2 %
BILIRUB SERPL-MCNC: 0.6 MG/DL (ref 0.2–1.3)
BUN SERPL-MCNC: 13 MG/DL (ref 7–30)
CALCIUM SERPL-MCNC: 8.5 MG/DL (ref 8.5–10.1)
CHLORIDE SERPL-SCNC: 108 MMOL/L (ref 94–109)
CHOLEST SERPL-MCNC: 136 MG/DL
CO2 SERPL-SCNC: 28 MMOL/L (ref 20–32)
CREAT SERPL-MCNC: 0.88 MG/DL (ref 0.52–1.04)
DIFFERENTIAL METHOD BLD: NORMAL
EOSINOPHIL # BLD AUTO: 0.1 10E9/L (ref 0–0.7)
EOSINOPHIL NFR BLD AUTO: 2.6 %
ERYTHROCYTE [DISTWIDTH] IN BLOOD BY AUTOMATED COUNT: 12 % (ref 10–15)
GFR SERPL CREATININE-BSD FRML MDRD: >90 ML/MIN/{1.73_M2}
GLUCOSE SERPL-MCNC: 89 MG/DL (ref 70–99)
HCT VFR BLD AUTO: 38.3 % (ref 35–47)
HDLC SERPL-MCNC: 71 MG/DL
HGB BLD-MCNC: 13 G/DL (ref 11.7–15.7)
IMM GRANULOCYTES # BLD: 0 10E9/L (ref 0–0.4)
IMM GRANULOCYTES NFR BLD: 0.2 %
LDLC SERPL CALC-MCNC: 49 MG/DL
LYMPHOCYTES # BLD AUTO: 2.8 10E9/L (ref 0.8–5.3)
LYMPHOCYTES NFR BLD AUTO: 51.6 %
MCH RBC QN AUTO: 31.2 PG (ref 26.5–33)
MCHC RBC AUTO-ENTMCNC: 33.9 G/DL (ref 31.5–36.5)
MCV RBC AUTO: 92 FL (ref 78–100)
MONOCYTES # BLD AUTO: 0.3 10E9/L (ref 0–1.3)
MONOCYTES NFR BLD AUTO: 5.3 %
NEUTROPHILS # BLD AUTO: 2.2 10E9/L (ref 1.6–8.3)
NEUTROPHILS NFR BLD AUTO: 40.1 %
NONHDLC SERPL-MCNC: 65 MG/DL
NRBC # BLD AUTO: 0 10*3/UL
NRBC BLD AUTO-RTO: 0 /100
PLATELET # BLD AUTO: 232 10E9/L (ref 150–450)
POTASSIUM SERPL-SCNC: 3.9 MMOL/L (ref 3.4–5.3)
PROT SERPL-MCNC: 6.6 G/DL (ref 6.8–8.8)
RBC # BLD AUTO: 4.17 10E12/L (ref 3.8–5.2)
SODIUM SERPL-SCNC: 142 MMOL/L (ref 133–144)
TRIGL SERPL-MCNC: 81 MG/DL
TSH SERPL DL<=0.005 MIU/L-ACNC: 2.37 MU/L (ref 0.4–4)
WBC # BLD AUTO: 5.5 10E9/L (ref 4–11)

## 2019-05-05 PROCEDURE — 99222 1ST HOSP IP/OBS MODERATE 55: CPT | Mod: AI | Performed by: PSYCHIATRY & NEUROLOGY

## 2019-05-05 PROCEDURE — 80061 LIPID PANEL: CPT | Performed by: PSYCHIATRY & NEUROLOGY

## 2019-05-05 PROCEDURE — 36415 COLL VENOUS BLD VENIPUNCTURE: CPT | Performed by: PSYCHIATRY & NEUROLOGY

## 2019-05-05 PROCEDURE — 80053 COMPREHEN METABOLIC PANEL: CPT | Performed by: PSYCHIATRY & NEUROLOGY

## 2019-05-05 PROCEDURE — 12400001 ZZH R&B MH UMMC

## 2019-05-05 PROCEDURE — 85025 COMPLETE CBC W/AUTO DIFF WBC: CPT | Performed by: PSYCHIATRY & NEUROLOGY

## 2019-05-05 PROCEDURE — 84443 ASSAY THYROID STIM HORMONE: CPT | Performed by: PSYCHIATRY & NEUROLOGY

## 2019-05-05 RX ORDER — QUETIAPINE FUMARATE 25 MG/1
25-50 TABLET, FILM COATED ORAL 3 TIMES DAILY PRN
Status: DISCONTINUED | OUTPATIENT
Start: 2019-05-05 | End: 2019-05-13 | Stop reason: HOSPADM

## 2019-05-05 ASSESSMENT — MIFFLIN-ST. JEOR: SCORE: 1136.5

## 2019-05-05 ASSESSMENT — ACTIVITIES OF DAILY LIVING (ADL)
DRESS: INDEPENDENT;SCRUBS (BEHAVIORAL HEALTH)
HYGIENE/GROOMING: INDEPENDENT
ORAL_HYGIENE: INDEPENDENT

## 2019-05-05 NOTE — PROGRESS NOTES
"   05/05/19 1300   Behavioral Health   Hallucinations denies / not responding to hallucinations   Thinking intact   Orientation person: oriented;place: oriented   Memory baseline memory   Insight insight appropriate to situation   Judgement intact   Eye Contact at examiner   Affect full range affect   Mood mood is calm   Physical Appearance/Attire attire appropriate to age and situation   Hygiene well groomed   Elopement   (none stated or observed)   Activity other (see comment)  (active)   Speech clear;coherent   Medication Sensitivity no stated side effects;no observed side effects   Psychomotor / Gait balanced;steady   Activities of Daily Living   Hygiene/Grooming independent   Oral Hygiene independent   Dress independent;scrubs (behavioral health)   Room Organization independent     Pt was calm, approachable, and cooperative. Pt social with peers. Pt attended and participated in groups and Pt activities. Pt denied anxiety and depressive symptoms. Pt reported feeling \"down\" - due to boredom and misses her boyfriend. Pt reported having better sleep in the last night. Pt stated having a strong appetite. Pt denied SI and SIB. Pt denied hallucinations/psychotic symptoms.  "

## 2019-05-05 NOTE — PROGRESS NOTES
05/04/19 2030   Psycho Education   Type of Intervention structured groups   Response participates, initiates socially appropriate   Hours 1   Lily participated in psychotherapy group which included a mindfulness activity focusing on the 5 senses and then processing as a group.She actively engaged with the group. Initially she presented as sad but was able to laugh with others during group and ended in a much brighter mood. This writer suggested using this type of sensory activity to reduce anxiety.
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
## 2019-05-05 NOTE — PROGRESS NOTES
SPIRITUAL HEALTH SERVICES  SPIRITUAL ASSESSMENT Progress Note  [facility] ([facility]) [facility]   ON-CALL VISIT    REFERRAL SOURCE: Hospital  visit request    I had an on-call visit with patient, Lily San. Lily was curious about how chaplains might be able to assist her during her stay. I provided a brief orientation to the role of SHS/chaplains in the behavioral health units. Lily expressed interest in the integrative methods such as guided meditation and mindfulness practices. She also requested a Bible, which I provided to her (New Testament with Psatiffanys).    PLAN: Unit  will be advised of this on-call visit.    Mike Florentino  Chaplain Resident  Pager [phone]
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
## 2019-05-06 PROCEDURE — 12400001 ZZH R&B MH UMMC

## 2019-05-06 PROCEDURE — 99232 SBSQ HOSP IP/OBS MODERATE 35: CPT | Performed by: PSYCHIATRY & NEUROLOGY

## 2019-05-06 PROCEDURE — H2032 ACTIVITY THERAPY, PER 15 MIN: HCPCS

## 2019-05-06 PROCEDURE — 90837 PSYTX W PT 60 MINUTES: CPT

## 2019-05-06 PROCEDURE — G0177 OPPS/PHP; TRAIN & EDUC SERV: HCPCS

## 2019-05-06 ASSESSMENT — ACTIVITIES OF DAILY LIVING (ADL)
DRESS: INDEPENDENT
HYGIENE/GROOMING: INDEPENDENT
HYGIENE/GROOMING: INDEPENDENT
ORAL_HYGIENE: INDEPENDENT

## 2019-05-06 NOTE — PLAN OF CARE
"Initial OT Note     Pt attended 3 out of 3 OT groups offered. Pt actively participated in a structured occupational therapy group with a focus on increasing self-awareness and self-esteem. Pt demonstrated good focus and planning while participating in novel goal-directed task. Pt demonstrated active listening and declined to share contributions with peers. Pt actively participated in occupational therapy clinic. Pt was able to ask for assistance as needed, and independently initiate self-selected task. Pt reported \"I am very indecisive so I'm sorry it will take me awhile to decide.\" Pt demonstrated attention to detail and patience during task completion. Pt required multiple cues to terminate task and clean up personal area.     Pt actively participated in a structured occupational therapy group with a focus on identifying positive and negative coping skills through a goal-directed task and group discussion. Pt independently followed 3-step directions of goal-directed task. Pt shared thoughtful contributions and identified \"listening to music\" as a positive coping skill that she would like to utilize more. Pt maintained focus for full duration of group. Pt appeared comfortable interacting with peers and brightened upon social interaction. Pt will be given self-assessment form, and OT staff will explain the purpose of including them in their treatment plan and offer options for meeting their needs.      "

## 2019-05-06 NOTE — PLAN OF CARE
"Patient has been up and visible in the milieu.  Full range affect.  Does acknowledge some anxiety related to roommate being disruptive and intrusive.  Describes mood as \"OK\".  Denies SI/SIB or thoughts to hurt others.  Rates depression as 3, anxiety as 5.    Denies hallucinations.  Thinking is linear and organized.  Describes her life as marked improvement over the years.  States DBT has been helpful in the past.  Reports daily THC use.    Reluctant to idea of medication at this time.  Unsure of where she will be living when discharged.  Feels hopeful.  "

## 2019-05-06 NOTE — PROVIDER NOTIFICATION
"   05/05/19 2000   Behavioral Health   Hallucinations denies / not responding to hallucinations   Thinking paranoid   Orientation place: oriented;date: oriented;time: oriented   Memory baseline memory   Insight insight appropriate to situation;insight appropriate to events   Judgement impaired   Eye Contact at examiner   Affect blunted, flat;sad   Mood depressed;mood is calm   Physical Appearance/Attire appears stated age;attire appropriate to age and situation   Hygiene well groomed   Suicidality other (see comments)  (denies)   1. Wish to be Dead No   Self Injury other (see comment)  (denies)   Elopement   (none observed)   Activity other (see comment)  (Active)   Speech clear;coherent   Medication Sensitivity no stated side effects;no observed side effects   Psychomotor / Gait balanced;steady     Lily was active and social in the Milieu. She ate dinner and played games with peers. She Reported feeling \"down\" and \"a little anxious\" (rated anxiety at 6 out 10). Her mom came to visit her this evening and she reported having a good visit. She was pleasant and cooperative. She stated she is afraid of talking to staff on the unit. She did not want to tell this writer what she meant by that. She said \"what I say can be used against me.\" She denied Hallucinations, SI, and SIB.   "

## 2019-05-06 NOTE — PROGRESS NOTES
"[facility], [place]   Psychiatric Progress Note        Interim History:   The patient's care was discussed with the treatment team during the daily team meeting and/or staff's chart notes were reviewed.  Staff report patient has been doing well. Some roommate issues. Denies SI or SIB.     The patient reports that she is \"pretty good.\" Mood is \"better today.\" Sleeping a little better. Eating well. Denies SI. Says that she is \"not sure of the reaction\" with Seroquel but is fine to try it again. Doesn't want to schedule any medication right now. Mother visited and this went well.          Medications:          Allergies:     Allergies   Allergen Reactions     Sulfa Drugs      Other reaction(s): GI Upset     Sulfasalazine      Other reaction(s): Nausea     Abilify [Aripiprazole] Other (See Comments)     Increased agitation, emotional irritability, suicidal ideation and self injurious behavior       Buspar [Buspirone] Other (See Comments)     Ceclor Cd [Cefaclor Monohydrate]      Gabapentin Other (See Comments)     Increased agitation, emotional irritability, suicidal ideation and self injurious behavior       Lexapro [Escitalopram] Other (See Comments)     Increased agitation, emotional irritability, suicidal ideation and self injurious behavior     Prednisone      Depression - suicidal thoughts     Prozac [Fluoxetine] Other (See Comments)     Agitation, emotional irritability, suicidal ideation, self injurious behavior     Seroquel [Quetiapine] Other (See Comments)     Increased agitation, emotional irritability, suicidal ideation and self injurious behavior       Wellbutrin [Bupropion] Other (See Comments)     Increased agitation, emotional irritability, suicidal ideation and self injurious behavior          Labs:   No results found for this or any previous visit (from the past 24 hour(s)).       Psychiatric Examination:     /74   Pulse 65   Temp 97.6  F (36.4  C) (Tympanic)   " Resp 16   Ht 1.524 m (5')   Wt 46 kg (101 lb 6.6 oz)   LMP 05/04/2019 (Exact Date)   SpO2 99%   BMI 19.81 kg/m    Weight is 101 lbs 6.59 oz  Body mass index is 19.81 kg/m .  Orthostatic Vitals       Most Recent      Sitting Orthostatic /74 05/06 0757    Sitting Orthostatic Pulse (bpm) 65 05/06 0757    Standing Orthostatic /85 05/06 0757    Standing Orthostatic Pulse (bpm) 77 05/06 0757            Appearance: awake, alert and adequately groomed  Attitude:  cooperative  Eye Contact:  good  Mood:  better  Affect:  mood congruent  Speech:  clear, coherent  Psychomotor Behavior:  no evidence of tardive dyskinesia, dystonia, or tics  Thought Process:  goal oriented  Associations:  no loose associations  Thought Content:  no evidence of suicidal ideation or homicidal ideation and no evidence of psychotic thought  Insight:  fair  Judgement:  fair  Oriented to:  time, person, and place  Attention Span and Concentration:  intact  Recent and Remote Memory:  fair    Clinical Global Impressions  First:  Considering your total clinical experience with this particular patient population, how severe are the patient's symptoms at this time?: 7 (05/05/19 0628)  Compared to the patient's condition at the START of treatment, this patient's condition is:: 4 (05/05/19 0628)  Most recent:  Considering your total clinical experience with this particular patient population, how severe are the patient's symptoms at this time?: 7 (05/05/19 0628)  Compared to the patient's condition at the START of treatment, this patient's condition is:: 4 (05/05/19 0628)         Precautions:     Behavioral Orders   Procedures     Code 1 - Restrict to Unit     Routine Programming     As clinically indicated     Status 15     Every 15 minutes.     Suicide precautions     Patients on Suicide Precautions should have a Combination Diet ordered that includes a Diet selection(s) AND a Behavioral Tray selection for Safe Tray - with utensils, or Safe  Tray - NO utensils            Diagnoses:     PTSD (post-traumatic stress disorder)  History of BPD  Cannabis use disorder  History of polysubstance abuse         Plan:     1) Continue PRN Seroquel.   2) CTC to meet with patient to discuss options for CD treatment versus IRTS.   3) 1:1 Art therapy      Disposition Plan   Reason for ongoing admission: poses an imminent risk to self  Discharge location: TBD  Discharge Medications: not ordered  Follow-up Appointments: not scheduled  Legal Status: voluntary  Entered by: Astno James on 5/6/2019 at 11:32 AM

## 2019-05-06 NOTE — PLAN OF CARE
BEHAVIORAL TEAM DISCUSSION    Participants: 4A Provider: Dr. Aston James MD; 4A RN's: Helena Tejdea RN; 4A CTC's: Charlette Garcia ([facility]).  Progress: Continuing to Assess .  Continued Stay Criteria/Rationale: New Patient   Medical/Physical: Deferred (see medical notes).  Precautions:    Behavioral Orders   Procedures    Code 1 - Restrict to Unit    Routine Programming     As clinically indicated    Status 15     Every 15 minutes.    Suicide precautions     Patients on Suicide Precautions should have a Combination Diet ordered that includes a Diet selection(s) AND a Behavioral Tray selection for Safe Tray - with utensils, or Safe Tray - NO utensils       Plan: CTC will coordinate disposition and aftercare planning.  The following services will be provided to the patient; psychiatric assessment, medication management, therapeutic milieu, individual and group support, art therapy, and skills/OT groups.   Rationale for change in precautions or plan: No Change.

## 2019-05-06 NOTE — PLAN OF CARE
The patient specific goals include:   Patient will participate in unit programming  Patient will identify triggers and positive coping skills  Patient will take medications as prescribed by physician both for mental health and medical  Patient coached to work on coping packet    The patient identified the following reasons for hospitalization:  Depressive symptoms  Suicidal Intent    The patient identified the following goals for discharge:  Mood stabilization  Absence of suicidal thoughts.

## 2019-05-07 PROCEDURE — H2032 ACTIVITY THERAPY, PER 15 MIN: HCPCS

## 2019-05-07 PROCEDURE — 12400001 ZZH R&B MH UMMC

## 2019-05-07 PROCEDURE — 99232 SBSQ HOSP IP/OBS MODERATE 35: CPT | Performed by: PSYCHIATRY & NEUROLOGY

## 2019-05-07 PROCEDURE — G0177 OPPS/PHP; TRAIN & EDUC SERV: HCPCS

## 2019-05-07 ASSESSMENT — ACTIVITIES OF DAILY LIVING (ADL)
DRESS: INDEPENDENT
ORAL_HYGIENE: INDEPENDENT
ORAL_HYGIENE: INDEPENDENT
HYGIENE/GROOMING: INDEPENDENT
DRESS: INDEPENDENT
LAUNDRY: WITH SUPERVISION
HYGIENE/GROOMING: INDEPENDENT

## 2019-05-07 ASSESSMENT — MIFFLIN-ST. JEOR: SCORE: 1141.89

## 2019-05-07 NOTE — PROGRESS NOTES
Initial OT Assessment     05/07/19 1100   Clinical Impression   Affect Appropriate to situation   Orientation Oriented to person, place and time   Appearance and ADLs Neatly groomed   Attention to Internal Stimuli No observed signs   Interaction Skills Interacts appropriately with staff;Interacts appropriately with peers   Ability to Communicate Needs Independent   Verbal Content Articulate;Clear;Appropriate to topic   Ability to Maintain Boundaries Maintains appropriate physical boundaries;Maintains appropriate verbal boundaries   Participation Initiates participation   Concentration Concentrates 50 minutes   Ability to Concentrate Without difficulty   Follows and Comprehends Directions Independently follows 2 step verbal directions   Memory Delayed and immediate recall intact   Organization Independently organizes medium tasks   Decision Making Needs choices limited to 2 choices   Planning and Problem Solving Occasionally needs assist/feedback   Ability to Apply and Learn Concepts Applies within group structure   Frustrations / Stress Tolerance Independently identifies sources of frustration/stress;Independently identifies skills    Level of Insight Insightful into needs, issues, goals   Self Esteem Can identify positives   Social Supports Has knowledge of support systems
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
## 2019-05-07 NOTE — PROGRESS NOTES
Behavioral Health  Note    Behavioral Health  Spirituality Group Note    UNIT 4A    Name: Lily San YOB: 1995   MRN: [id number] Age: 23 year old      Patient attended -led group, which included discussion of spirituality, coping with illness and building resilience.    Patient attended group for NC hrs.    The patient actively participated in group discussion and patient demonstrated an appreciation of topic's application for their personal circumstances.      Jessica Lopez  Chaplain Resident  Pager  [phone]

## 2019-05-07 NOTE — PROGRESS NOTES
CTC attempted to make the following DBT referrals:   [facility]-Left msg with .   ACP-Left Msg with
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
## 2019-05-07 NOTE — PROGRESS NOTES
SPIRITUAL HEALTH SERVICES  SPIRITUAL ASSESSMENT Progress Note  [facility] [place] [place]     REFERRAL SOURCE: Pt request for visit following community meeting      Met with Lily in the [facility].     We explored a number of ways in which she can connect with other people, her relationship with God, and her own spirituality - these included practicing patience and healthy boundaries; developing a practice of prayer; and connecting with a [place] community for support.     I offered a prayer that incorporated these themes of connection, healing, and wholeness.     PLAN: I will continue to follow-up with Lily weekly while she is on the unit.    Jessica Lopez  Chaplain Resident  Pager  [phone]

## 2019-05-07 NOTE — PLAN OF CARE
OT General Care Plan    Pt attended 2 out of 2 OT groups offered. Pt actively participated in occupational therapy clinic. Pt was able to ask for assistance as needed, and independently initiate self-selected task. Pt demonstrated good focus, planning, and attention to detail. Pt appeared comfortable interacting with peers and brightened upon social interaction. Pt conversed with peers bonding over music genre interests. Pt is very polite.
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
## 2019-05-07 NOTE — PROGRESS NOTES
05/07/19 1200   Behavioral Health   Hallucinations denies / not responding to hallucinations   Thinking intact   Orientation person: oriented;place: oriented   Memory baseline memory   Insight admits / accepts;insight appropriate to situation   Judgement impaired   Affect full range affect   Mood mood is calm   Physical Appearance/Attire attire appropriate to age and situation   Hygiene well groomed   Suicidality other (see comments)  (denies)   1. Wish to be Dead No   Self Injury other (see comment)  (denies)   Elopement   (none stated or observed)   Activity other (see comment)  (active)   Speech coherent;clear   Medication Sensitivity no observed side effects;no stated side effects   Psychomotor / Gait balanced;steady   Activities of Daily Living   Hygiene/Grooming independent   Oral Hygiene independent   Dress independent   Room Organization independent     Pt was calm, approachable, and cooperative. Pt was social, appropriate with peers. Pt attended and participated in groups. Pt continues to have bright/full range affect. Pt denied anxiety and depressive symptoms. Pt reported feeling conflicted about her options and her feelings toward her boyfriend. However, Pt feels supported by the unit and is understanding of the resources available to her. Pt denied questions, concerns, or requests at this time. Pt denied SI and SIB. Pt denied hallucinations/psychotic symptoms.

## 2019-05-07 NOTE — PROGRESS NOTES
05/06/19 [facility]   Therapeutic Recreation   Type of Intervention structured groups   Activity game   Response Participates, initiates socially appropriate   Hours 1     Pt participated in Therapeutic Recreation group with focus on stress reduction, creative expression, and leisure participation. Engaged and cooperative in group recreational group via a group drawing game. Pt participated throughout entire duration of the group. Pt was observed laughing and smiling throughout the group. Showed progress in session goals. Pt mood was calm, affect was bright and brightened even more with socialization.

## 2019-05-07 NOTE — PROGRESS NOTES
Pt participated in non-verbal, expressive and socially-interactive movement with therapeutic themes of group cohesion and support.  Pt was a quiet leader in creating a caring and welcoming environment.         05/07/19 1148   Dance Movement Therapy   Type of Intervention structured groups   Response participates, initiates socially appropriate   Hours 1

## 2019-05-07 NOTE — PROGRESS NOTES
"   05/06/19 2100   Behavioral Health   Hallucinations denies / not responding to hallucinations   Thinking intact   Orientation person: oriented;place: oriented;date: oriented;time: oriented   Memory baseline memory   Insight admits / accepts   Judgement impaired   Eye Contact at examiner   Affect full range affect   Mood mood is calm   Physical Appearance/Attire attire appropriate to age and situation;other (see comment)  (pt showered)   Hygiene well groomed   Suicidality other (see comments)  (pt denied)   1. Wish to be Dead No   2. Non-Specific Active Suicidal Thoughts  No   Self Injury other (see comment)  (pt denied SIB)   Elopement   (no concerns)   Activity other (see comment)  (pt was social and visible in the milieu)   Speech clear;coherent   Medication Sensitivity no stated side effects;no observed side effects   Psychomotor / Gait balanced;steady   Therapeutic Recreation   Type of Intervention structured groups   Activity game   Response Participates, initiates socially appropriate   Hours 1   Activities of Daily Living   Hygiene/Grooming independent   Oral Hygiene independent   Dress independent   Room Organization independent     Pt denied all mental health concerns but reported feeling \"lonliness.\" Pt had a visit with her mother she said went well. Pt was social and active in the milieu. Pt reported a desire to get a job after discharge. Pt showed full range of affect. Pt was cooperative with staff.  "

## 2019-05-08 PROCEDURE — G0177 OPPS/PHP; TRAIN & EDUC SERV: HCPCS

## 2019-05-08 PROCEDURE — 12400001 ZZH R&B MH UMMC

## 2019-05-08 PROCEDURE — 99232 SBSQ HOSP IP/OBS MODERATE 35: CPT | Performed by: PSYCHIATRY & NEUROLOGY

## 2019-05-08 PROCEDURE — H2032 ACTIVITY THERAPY, PER 15 MIN: HCPCS

## 2019-05-08 ASSESSMENT — ACTIVITIES OF DAILY LIVING (ADL)
HYGIENE/GROOMING: INDEPENDENT
ORAL_HYGIENE: INDEPENDENT
HYGIENE/GROOMING: INDEPENDENT
DRESS: STREET CLOTHES;INDEPENDENT

## 2019-05-08 NOTE — PLAN OF CARE
"Patient has been up and visible.  Full range affect.  Reports feeling \"good\", although she states she did not sleep very well last night.  Attending and participating in unit programming. Social.  Denies SI/SIB or thoughts to hurt others. Denies hallucinations.  Identifies \"a little\" depression, \"a little more\" anxiety--related to the uncertainty of where she will go when discharged, and her relationship with her ??boyfriend.  Really wants to move forward in a positive way in her life. Feels hopeful but apprehensive.  Denies concerns regarding appetite.  Continuing to prefer not to take medication.    "

## 2019-05-08 NOTE — PLAN OF CARE
"OT General Care Plan    Pt attended 3 out of 3 OT groups offered. Pt actively participated in a structured occupational therapy topic group involving identification of coping skills beginning with every letter of the alphabet facilitated through group discussion. Pt consistently contributed ideas of positive coping skills, and was receptive and respectful of ideas contributed by peers. Pt identified \"having gratitude\" as their most important coping skills, and identified \"being more positive towards myself\" as a coping skill that they intend to start using more frequently.    Pt actively participated in occupational therapy clinic. Pt was able to ask for assistance as needed, and independently initiate self-selected task. Pt demonstrated good focus, planning, and problem solving. Pt appeared comfortable interacting with peers.     Pt actively participated in a structured occupational therapy group with a focus on a visual-spatial leisure task. Pt required consistent cues to attend to  2-step directions of the novel task. Pt demonstrated strategic planning and problem solving.         "

## 2019-05-08 NOTE — PLAN OF CARE
48 Hour Nursing Assessment    Patient evaluation continues. Assessed mood, anxiety, thoughts and behavior. Patient denies auditory or visual hallucinations. Is progressing toward goals. Encourage participation in groups and developing healthy coping skills. Will continue to assess.     Pt has been visible and participating in the milieu. Pt denies SI, SIB and hallucinations. Pt reports some depression; she has been struggling with not calling her boyfriend, as she misses him. Pt processed this with a staff member. Pt denies SI, SIB and hallucinations. Pt reports that sleep and appetite are good. Pt denies any other concerns at this time.

## 2019-05-08 NOTE — PROGRESS NOTES
"[facility], [place]   Psychiatric Progress Note        Interim History:   The patient's care was discussed with the treatment team during the daily team meeting and/or staff's chart notes were reviewed.  Staff report patient has been doing well.     The patient reports that she didn't sleep that well last night. Mood is \"better in the mornings.\" Eating well. Denies SI. Still wanting to continue with PRN medications and look at options for increased therapy.           Medications:          Allergies:     Allergies   Allergen Reactions     Sulfa Drugs      Other reaction(s): GI Upset     Sulfasalazine      Other reaction(s): Nausea     Abilify [Aripiprazole] Other (See Comments)     Increased agitation, emotional irritability, suicidal ideation and self injurious behavior       Buspar [Buspirone] Other (See Comments)     Ceclor Cd [Cefaclor Monohydrate]      Gabapentin Other (See Comments)     Increased agitation, emotional irritability, suicidal ideation and self injurious behavior       Lexapro [Escitalopram] Other (See Comments)     Increased agitation, emotional irritability, suicidal ideation and self injurious behavior     Prednisone      Depression - suicidal thoughts     Prozac [Fluoxetine] Other (See Comments)     Agitation, emotional irritability, suicidal ideation, self injurious behavior     Seroquel [Quetiapine] Other (See Comments)     Increased agitation, emotional irritability, suicidal ideation and self injurious behavior       Wellbutrin [Bupropion] Other (See Comments)     Increased agitation, emotional irritability, suicidal ideation and self injurious behavior          Labs:   No results found for this or any previous visit (from the past 24 hour(s)).       Psychiatric Examination:     /69   Pulse 67   Temp 97.1  F (36.2  C) (Tympanic)   Resp 16   Ht 1.524 m (5')   Wt 46.5 kg (102 lb 9.6 oz)   LMP 05/04/2019 (Exact Date)   SpO2 95%   BMI 20.04 kg/m  "   Weight is 102 lbs 9.6 oz  Body mass index is 20.04 kg/m .  Orthostatic Vitals       Most Recent      Sitting Orthostatic /74 05/06 0757    Sitting Orthostatic Pulse (bpm) 65 05/06 0757    Standing Orthostatic /85 05/06 0757    Standing Orthostatic Pulse (bpm) 77 05/06 0757            Appearance: awake, alert and adequately groomed  Attitude:  cooperative  Eye Contact:  good  Mood:  good  Affect:  mood congruent  Speech:  clear, coherent  Psychomotor Behavior:  no evidence of tardive dyskinesia, dystonia, or tics  Thought Process:  goal oriented  Associations:  no loose associations  Thought Content:  no evidence of suicidal ideation or homicidal ideation and no evidence of psychotic thought  Insight:  fair  Judgement:  fair  Oriented to:  time, person, and place  Attention Span and Concentration:  intact  Recent and Remote Memory:  fair    Clinical Global Impressions  First:  Considering your total clinical experience with this particular patient population, how severe are the patient's symptoms at this time?: 7 (05/05/19 0628)  Compared to the patient's condition at the START of treatment, this patient's condition is:: 4 (05/05/19 0628)  Most recent:  Considering your total clinical experience with this particular patient population, how severe are the patient's symptoms at this time?: 7 (05/05/19 0628)  Compared to the patient's condition at the START of treatment, this patient's condition is:: 4 (05/05/19 0628)         Precautions:     Behavioral Orders   Procedures     Code 1 - Restrict to Unit     Routine Programming     As clinically indicated     Status 15     Every 15 minutes.     Suicide precautions     Patients on Suicide Precautions should have a Combination Diet ordered that includes a Diet selection(s) AND a Behavioral Tray selection for Safe Tray - with utensils, or Safe Tray - NO utensils            Diagnoses:     PTSD (post-traumatic stress disorder)  History of BPD  Cannabis use  disorder  History of polysubstance abuse         Plan:     1) Continue PRN Seroquel.   2) CTC to meet with patient to discuss options for disposition.  3) 1:1 Art therapy  4) Patient will be seen by Deb Naegele, NP starting tomorrow.       Disposition Plan   Reason for ongoing admission: poses an imminent risk to self  Discharge location: TBD  Discharge Medications: not ordered  Follow-up Appointments: not scheduled  Legal Status: voluntary  Entered by: Aston James on 5/8/2019 at 9:12 AM

## 2019-05-09 PROCEDURE — G0177 OPPS/PHP; TRAIN & EDUC SERV: HCPCS

## 2019-05-09 PROCEDURE — 90837 PSYTX W PT 60 MINUTES: CPT

## 2019-05-09 PROCEDURE — 99232 SBSQ HOSP IP/OBS MODERATE 35: CPT | Performed by: CLINICAL NURSE SPECIALIST

## 2019-05-09 PROCEDURE — 12400001 ZZH R&B MH UMMC

## 2019-05-09 PROCEDURE — H2032 ACTIVITY THERAPY, PER 15 MIN: HCPCS

## 2019-05-09 ASSESSMENT — ACTIVITIES OF DAILY LIVING (ADL)
HYGIENE/GROOMING: INDEPENDENT
ORAL_HYGIENE: INDEPENDENT
ORAL_HYGIENE: INDEPENDENT
DRESS: INDEPENDENT
HYGIENE/GROOMING: INDEPENDENT
DRESS: STREET CLOTHES;INDEPENDENT

## 2019-05-09 ASSESSMENT — MIFFLIN-ST. JEOR: SCORE: 1139.17
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
## 2019-05-09 NOTE — PROGRESS NOTES
Pt had a good shift. Active on milieu and calm/cooperative with staff. Pt was social with staff and peers. Pt says she is feeling better than earlier in the shift, and says her depression is 1.5/10. Pt says she is a little anxious (4/10), but says it is manageable. Pt is trying to figure out where to go after discharge, potentially a sober house or IRTS. Pt says she enjoys structure and finds DBT very helpful. Pt says she would prefer to only do therapy over taking a medication. Pt has decent insight and knows that she needs more intensive therapy (more than one day a week). Had a good visit with mom. Denies SI/SIB. Pt reported feeling a little triggered by other pt's drug talk but did not specify which pts, staff assured pt they will emphasize appropriate language in community meeting.       05/08/19 2200   Behavioral Health   Hallucinations denies / not responding to hallucinations   Thinking poor concentration;intact   Orientation person: oriented;place: oriented;date: oriented;time: oriented   Memory baseline memory   Insight admits / accepts   Judgement intact  (limited)   Eye Contact at examiner   Affect full range affect   Mood anxious;mood is calm   Physical Appearance/Attire attire appropriate to age and situation   Hygiene well groomed   Suicidality other (see comments)  (denies)   1. Wish to be Dead No   2. Non-Specific Active Suicidal Thoughts  No   Self Injury other (see comment)  (denies)   Elopement   (none stated or observed)   Activity other (see comment)  (active on milieu)   Speech clear;coherent   Medication Sensitivity no stated side effects;no observed side effects   Psychomotor / Gait balanced;steady   Activities of Daily Living   Hygiene/Grooming independent   Oral Hygiene independent   Dress street clothes;independent   Room Organization independent

## 2019-05-09 NOTE — PROGRESS NOTES
05/09/19 1900   Art Therapy   Type of Intervention 1:1 Art Therapy   Response Self reflective   Hours 1   Treatment Detail    Drawing with marion claros   Problem-  PTSD (post-traumatic stress disorder)  History of BPD  Cannabis use disorder  History of polysubstance abuse        Goal- express, cope and regulate through art therapy/ dbt directive     Outcome- Pt wanted to meet with writer 1:1 on days when writer is here. Discussion and art was about healthy relationships and abandonment issues. Pt feels conversation is helpful and asked to meet 1:1 tomorrow as well. Writer also let her know that there will be an art Therapy group tomorrow afternoon that will focus on self esteem and healthy choices.

## 2019-05-09 NOTE — PLAN OF CARE
OT General Care Plan    Pt attended 1 out of 2 OT groups offered. Pt actively participated in occupational therapy clinic. Pt was able to ask for assistance as needed, and independently initiate self-selected task. Pt demonstrated good focus, planning, and problem solving. Pt appeared comfortable interacting with peers and brightened upon social interaction. Pt attended second group for about 15 minutes (no charge). Pt was quiet and attentive with little social interaction with peers; much different presentation than this morning in group. Will continue to assess.

## 2019-05-09 NOTE — PROGRESS NOTES
"[facility], [place]   Psychiatric Progress Note        Interim History:   The patient's care was discussed with the treatment team during the daily team meeting and/or staff's chart notes were reviewed.  Staff report patient is visible in the milieu.    Psychiatric symptoms and interventions:   No scheduled medications, Continue PRN Seroquel  Patient reports anxiety but wants to use her coping skills rather than medications to manage her anxiety. Patient reports feeling \"fragile\" and does ot feel she has support in the community. Patient feels if she is discharged when will become suicidal again.     Medical: No acute concerns     Behavioral/psychology/social:   Encouraged patient to attend therapeutic hospital programming as tolerated   1:1 Art therapy            Medications:          Allergies:     Allergies   Allergen Reactions     Sulfa Drugs      Other reaction(s): GI Upset     Sulfasalazine      Other reaction(s): Nausea     Abilify [Aripiprazole] Other (See Comments)     Increased agitation, emotional irritability, suicidal ideation and self injurious behavior       Buspar [Buspirone] Other (See Comments)     Ceclor Cd [Cefaclor Monohydrate]      Gabapentin Other (See Comments)     Increased agitation, emotional irritability, suicidal ideation and self injurious behavior       Lexapro [Escitalopram] Other (See Comments)     Increased agitation, emotional irritability, suicidal ideation and self injurious behavior     Prednisone      Depression - suicidal thoughts     Prozac [Fluoxetine] Other (See Comments)     Agitation, emotional irritability, suicidal ideation, self injurious behavior     Seroquel [Quetiapine] Other (See Comments)     Increased agitation, emotional irritability, suicidal ideation and self injurious behavior       Wellbutrin [Bupropion] Other (See Comments)     Increased agitation, emotional irritability, suicidal ideation and self injurious behavior          " Labs:   No results found for this or any previous visit (from the past 24 hour(s)).       Psychiatric Examination:     /68   Pulse 65   Temp 97.8  F (36.6  C) (Tympanic)   Resp 16   Ht 1.524 m (5')   Wt 46.3 kg (102 lb)   LMP 05/04/2019 (Exact Date)   SpO2 95%   BMI 19.92 kg/m    Weight is 102 lbs 0 oz  Body mass index is 19.92 kg/m .  Orthostatic Vitals       Most Recent      Sitting Orthostatic /69 05/09 0700    Sitting Orthostatic Pulse (bpm) 67 05/09 0700    Standing Orthostatic /76 05/09 0700    Standing Orthostatic Pulse (bpm) 71 05/09 0700            Appearance: awake, alert and adequately groomed  Attitude:  cooperative  Eye Contact:  good  Mood:  anxious and better  Affect:  appropriate and in normal range  Speech:  clear, coherent  Psychomotor Behavior:  no evidence of tardive dyskinesia, dystonia, or tics  Throught Process:  logical, linear and goal oriented  Associations:  no loose associations  Thought Content:  no evidence of suicidal ideation or homicidal ideation, no evidence of psychotic thought and no auditory hallucinations present  Insight:  good  Judgement:  intact  Oriented to:  time, person, and place  Attention Span and Concentration:  intact  Recent and Remote Memory:  intact    Clinical Global Impressions  First:  Considering your total clinical experience with this particular patient population, how severe are the patient's symptoms at this time?: 7 (05/05/19 0628)  Compared to the patient's condition at the START of treatment, this patient's condition is:: 4 (05/05/19 0628)  Most recent:  Considering your total clinical experience with this particular patient population, how severe are the patient's symptoms at this time?: 7 (05/05/19 0628)  Compared to the patient's condition at the START of treatment, this patient's condition is:: 4 (05/05/19 0628)         Precautions:     Behavioral Orders   Procedures     Code 1 - Restrict to Unit     Routine Programming      As clinically indicated     Status 15     Every 15 minutes.     Suicide precautions     Patients on Suicide Precautions should have a Combination Diet ordered that includes a Diet selection(s) AND a Behavioral Tray selection for Safe Tray - with utensils, or Safe Tray - NO utensils            DIagnoses:   PTSD (post-traumatic stress disorder)  History of BPD  Cannabis use disorder  History of polysubstance abuse            Plan:   Legal Status: Voluntary     Mediation management: PRN Seroquel    Disposition plan: Stabilize with medications, live with mother, DBT therapy.

## 2019-05-09 NOTE — PROGRESS NOTES
Pt denies SI/SIB. States she is having a hard day, but is unable to elaborate on what she is thinking about or struggling with. She states she feels she is needing distractions and is up in her head. Writer had her practice a mindfulness exercise. She participated in the mindfulness exercise, but became distracted. She was able to share two different breathing techniques she uses. Pt tearful in group after check in and requested a weighted blanket. Writer asked what was bothering her- she was only able to state she was having a hard time and unable to elaborate.     05/09/19 1300   Behavioral Health   Hallucinations denies / not responding to hallucinations   Thinking poor concentration   Orientation person: oriented;place: oriented;date: oriented;time: oriented   Memory baseline memory   Insight poor   Judgement impaired   Eye Contact at examiner   Affect sad;full range affect   Mood depressed   Physical Appearance/Attire attire appropriate to age and situation   Hygiene well groomed   Suicidality other (see comments)  (denies)   1. Wish to be Dead No   2. Non-Specific Active Suicidal Thoughts  No   Self Injury other (see comment)  (denies)   Elopement   (No concerning statements or behaviors)   Activity withdrawn   Speech coherent;clear   Medication Sensitivity no observed side effects;no stated side effects   Psychomotor / Gait steady;balanced   Psycho Education   Type of Intervention 1:1 intervention   Response participates with encouragement   Hours 0.5   Treatment Detail Check in   Activities of Daily Living   Hygiene/Grooming independent   Oral Hygiene independent   Dress independent   Room Organization independent   Activity   Activity Assistance Provided independent

## 2019-05-10 PROCEDURE — G0177 OPPS/PHP; TRAIN & EDUC SERV: HCPCS

## 2019-05-10 PROCEDURE — 12400001 ZZH R&B MH UMMC

## 2019-05-10 PROCEDURE — 99232 SBSQ HOSP IP/OBS MODERATE 35: CPT | Performed by: CLINICAL NURSE SPECIALIST

## 2019-05-10 PROCEDURE — 90837 PSYTX W PT 60 MINUTES: CPT

## 2019-05-10 PROCEDURE — H2032 ACTIVITY THERAPY, PER 15 MIN: HCPCS

## 2019-05-10 ASSESSMENT — ACTIVITIES OF DAILY LIVING (ADL)
HYGIENE/GROOMING: INDEPENDENT
ORAL_HYGIENE: INDEPENDENT
HYGIENE/GROOMING: INDEPENDENT
DRESS: INDEPENDENT

## 2019-05-10 NOTE — PROGRESS NOTES
Pt stated she was having some insights into her situation that were helping her gain perspective.  For example, she realized that qualities in herself she views as weaknesses, can be seen by others as strengths.  She has been utilizing journaling that has been helpful.  Her movement was more expressive and socially-engaged than previous sessions.         05/09/19 1015   Dance Movement Therapy   Type of Intervention structured groups   Response participates, initiates socially appropriate   Hours 1

## 2019-05-10 NOTE — PROGRESS NOTES
"Pt had an okay shift. Pt was somewhat withdrawn and depressed. Pt declined a check-in with staff saying \"I don't feel like talking\". Pt was cooperative and polite. Pt later checked in with nurse. Unable to assess SI/SIB. Pt had a visit with mother that went well. Ate dinner, showered, and attended groups.         05/09/19 2200   Behavioral Health   Hallucinations denies / not responding to hallucinations   Thinking poor concentration   Orientation person: oriented;place: oriented;date: oriented;time: oriented   Memory baseline memory   Insight admits / accepts;other (see comment)  (limited)   Judgement impaired   Eye Contact at examiner   Affect sad   Mood depressed   Physical Appearance/Attire attire appropriate to age and situation   Hygiene well groomed   Suicidality other (see comments)  (JESSICA)   Self Injury other (see comment)  (JESSICA)   Elopement   (none stated or observed)   Activity withdrawn   Speech clear;coherent   Medication Sensitivity no stated side effects;no observed side effects   Psychomotor / Gait balanced;steady   Activities of Daily Living   Hygiene/Grooming independent   Oral Hygiene independent   Dress street clothes;independent   Room Organization independent     "
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
## 2019-05-10 NOTE — PLAN OF CARE
OT General Care Plan    Pt attended 3 out of 3 OT groups offered. Pt actively participated in a structure occupational therapy group with a focus on action planning and problem solving. Pt declined to share contributions with peers but demonstrated activity listening when peers were sharing contributions. Pt actively participated in occupational therapy clinic. Pt was able to ask for assistance as needed, and independently initiate self-selected task. Pt demonstrated good focus, planning, and problem solving. Pt actively participated in a structured occupational therapy group with a focus on facilitating social and leisure engagement. Pt was able to follow 2 step directions of the familiar task. Pt's affect appeared to brighten throughout group, as evidenced by frequent laughter with peers.

## 2019-05-10 NOTE — PROGRESS NOTES
CTC met with pt for about 30 minutes. CTC and pt engaged in a conversation that foused on lonliness and ways to manage the feeling. CTC explored pt's current beliefs about self and how that impact her current boundaries and relationships. CTC also dicussed discharge planning with pt and shared information regarding housing. Pt acknowledges that she will have to stay with her mom for a while. She expressed concern around how this might impact the progress they have made within their relationship. CTC encouraged pt to talk with her mom tonight about home expectations and ways things can be different at home this time around. CTC also encouraged pt to discuss how she would like to address conflict with her mom. CTC provided some suggestions. Pt also shared that she feels like she needs more treatment than just DBT. CTC explore other options. CTC and pt dicussed other activities she can do such as AA, looking into Community education classes's, reconnecting with healthy friends to help support her mental health.  CTC provided validation, active listening and utilized CBT and DBT concepts.

## 2019-05-10 NOTE — PLAN OF CARE
"Patient has been visible in the milieu.  Attending groups--social.  When in group she is bright and full range.  However during 1:1 interview she is sad, tearful.  Remains uncertain still about her living situation.  Is inclining toward resuming a relationship with her boyfriend.   She is proud that she has been sober 2 years--we discussed that her boyfriend is an addiction as well and she was quick to acknowledge that he is.   Currently denies SI/SIB.  Denies hallucinations. Thinking is linear and organized. Describes her mood as sad, \"some\" anxiety.   Hoping to get set up with a therapist, and a day program that is more than 2 days a week at discharge. Recognizes a need for structure when she leaves as is worried about relapse.  Denies concerns regarding sleep, appetite.  She was having difficulty with a roommate as she felt she has no where to have some quiet time. \"Tired of the conversations\".  Offered positive reinforcement.  "

## 2019-05-10 NOTE — PROGRESS NOTES
"[facility], [place]   Psychiatric Progress Note        Interim History:   The patient's care was discussed with the treatment team during the daily team meeting and/or staff's chart notes were reviewed.  Staff report patient is visible in the milieu and has been interacting with staff when she needs help.     Psychiatric symptoms and interventions:   No scheduled medications, Continue PRN Seroquel  Patient reports anxiety but wants to use her coping skills rather than medications to manage her anxiety. Patient reports feeling \"fragile\" and does ot feel she has support in the community. Patient feels if she is discharged when will become suicidal again.     5/10 Patient is tearful and reports she is concerned about not having enough support. Patient feels very anxious and thinks she is on the verge of relapsing. She is reporting depressed mood. She again declined medications and wanted to use her coping skills.     Medical: No acute concerns     Behavioral/psychology/social:   Encouraged patient to attend therapeutic hospital programming as tolerated   1:1 Art therapy         Medications:          Allergies:     Allergies   Allergen Reactions     Sulfa Drugs      Other reaction(s): GI Upset     Sulfasalazine      Other reaction(s): Nausea     Abilify [Aripiprazole] Other (See Comments)     Increased agitation, emotional irritability, suicidal ideation and self injurious behavior       Buspar [Buspirone] Other (See Comments)     Ceclor Cd [Cefaclor Monohydrate]      Gabapentin Other (See Comments)     Increased agitation, emotional irritability, suicidal ideation and self injurious behavior       Lexapro [Escitalopram] Other (See Comments)     Increased agitation, emotional irritability, suicidal ideation and self injurious behavior     Prednisone      Depression - suicidal thoughts     Prozac [Fluoxetine] Other (See Comments)     Agitation, emotional irritability, suicidal ideation, self " injurious behavior     Seroquel [Quetiapine] Other (See Comments)     Increased agitation, emotional irritability, suicidal ideation and self injurious behavior       Wellbutrin [Bupropion] Other (See Comments)     Increased agitation, emotional irritability, suicidal ideation and self injurious behavior          Labs:   No results found for this or any previous visit (from the past 24 hour(s)).       Psychiatric Examination:     /75   Pulse 60   Temp 97  F (36.1  C) (Tympanic)   Resp 16   Ht 1.524 m (5')   Wt 46.3 kg (102 lb)   LMP 05/04/2019 (Exact Date)   SpO2 95%   BMI 19.92 kg/m    Weight is 102 lbs 0 oz  Body mass index is 19.92 kg/m .  Orthostatic Vitals       Most Recent      Sitting Orthostatic /75 05/10 0738    Sitting Orthostatic Pulse (bpm) 60 05/10 0738    Standing Orthostatic /79 05/10 0738    Standing Orthostatic Pulse (bpm) 65 05/10 0738          Appearance: awake, alert and adequately groomed  Attitude:  cooperative  Eye Contact:  good  Mood:  anxious and better  Affect:  appropriate and in normal range  Speech:  clear, coherent  Psychomotor Behavior:  no evidence of tardive dyskinesia, dystonia, or tics  Throught Process:  logical, linear and goal oriented  Associations:  no loose associations  Thought Content:  no evidence of suicidal ideation or homicidal ideation, no evidence of psychotic thought and no auditory hallucinations present  Insight:  good  Judgement:  intact  Oriented to:  time, person, and place  Attention Span and Concentration:  intact  Recent and Remote Memory:  intact    Clinical Global Impressions  First:  Considering your total clinical experience with this particular patient population, how severe are the patient's symptoms at this time?: 7 (05/05/19 0628)  Compared to the patient's condition at the START of treatment, this patient's condition is:: 4 (05/05/19 0628)  Most recent:  Considering your total clinical experience with this particular  patient population, how severe are the patient's symptoms at this time?: 7 (05/05/19 0628)  Compared to the patient's condition at the START of treatment, this patient's condition is:: 4 (05/05/19 0628)         Precautions:     Behavioral Orders   Procedures     Code 1 - Restrict to Unit     Routine Programming     As clinically indicated     Status 15     Every 15 minutes.     Suicide precautions     Patients on Suicide Precautions should have a Combination Diet ordered that includes a Diet selection(s) AND a Behavioral Tray selection for Safe Tray - with utensils, or Safe Tray - NO utensils            DIagnoses:   PTSD (post-traumatic stress disorder)  History of BPD  Cannabis use disorder  History of polysubstance abuse         Plan:   Legal Status: Voluntary      Mediation management: PRN Seroquel     Disposition plan: Stabilize with medications, live with mother, DBT therapy.

## 2019-05-10 NOTE — PROGRESS NOTES
05/09/19 2202   General Information   Art Directive other (see comments)   AT directive is to create a drawing of a safe place and to identify five items within safe place that would represent each of the five senses. Goals of directive: trauma containment, emotional expression, identifying sensory and/or coping skills. Pt was a positive participant, focused on task for the full duration of group. Pt finished safe place drawing and briefly shared with group. Pts mood was calm.

## 2019-05-11 PROCEDURE — 12400001 ZZH R&B MH UMMC

## 2019-05-11 PROCEDURE — H2032 ACTIVITY THERAPY, PER 15 MIN: HCPCS

## 2019-05-11 ASSESSMENT — ACTIVITIES OF DAILY LIVING (ADL)
HYGIENE/GROOMING: INDEPENDENT
ORAL_HYGIENE: INDEPENDENT
DRESS: INDEPENDENT;STREET CLOTHES
HYGIENE/GROOMING: INDEPENDENT
ORAL_HYGIENE: INDEPENDENT
DRESS: INDEPENDENT

## 2019-05-11 NOTE — PROGRESS NOTES
05/10/19 2000   Behavioral Health   Hallucinations denies / not responding to hallucinations   Thinking poor concentration   Orientation person: oriented;place: oriented;date: oriented;time: oriented   Memory baseline memory   Insight admits / accepts   Judgement   (limited)   Eye Contact at examiner   Affect full range affect   Mood mood is calm   Physical Appearance/Attire attire appropriate to age and situation   Hygiene well groomed   Suicidality other (see comments)  (denies)   1. Wish to be Dead No   2. Non-Specific Active Suicidal Thoughts  No   Self Injury other (see comment)  (denies)   Elopement   (no concerns)   Activity withdrawn;isolative   Speech clear;coherent   Medication Sensitivity no observed side effects;no stated side effects   Psychomotor / Gait balanced;steady   Activities of Daily Living   Hygiene/Grooming independent   Oral Hygiene independent   Dress independent   Room Organization independent     Pt appeared to have full range of affect in the milieu. Pt was social with peers. Pt mood appeared calm. Pt was more withdrawn in the evening after a phone call. Pt had a visit with her mother.

## 2019-05-11 NOTE — PROGRESS NOTES
"   05/11/19 1200   Behavioral Health   Hallucinations denies / not responding to hallucinations   Thinking distractable   Orientation person: oriented;place: oriented   Memory baseline memory   Insight admits / accepts   Judgement impaired   Eye Contact at examiner   Affect full range affect   Mood mood is calm   Physical Appearance/Attire attire appropriate to age and situation   Hygiene well groomed   Suicidality other (see comments)  (denies)   1. Wish to be Dead No   2. Non-Specific Active Suicidal Thoughts  No   Self Injury other (see comment)  (denies)   Elopement   (none stated or observed)   Activity other (see comment)  (active)   Speech clear;coherent   Medication Sensitivity no stated side effects;no observed side effects   Psychomotor / Gait balanced;steady   Activities of Daily Living   Hygiene/Grooming independent   Oral Hygiene independent   Dress independent   Room Organization independent     Pt was calm, approachable, and cooperative. Pt was visible in the milieu, participated in groups. Pt was social with peers. Pt had a visit and reported it went well. Pt reported mood as \"good\", \"content\". Pt reported small anxiety related to discharge, but is content with aftercare plan. Pt denied SI and SIB. Pt denied hallucinations/psychotic symptoms.  "

## 2019-05-11 NOTE — PROGRESS NOTES
Participated in Music Therapy group with focus on mood elevation, validation and decreasing anxiety and improved group cohesiveness. Engaged and cooperative in music listening interventions.   Showed progress in session goals.  Quieter participation, but focused and engaged.

## 2019-05-12 PROCEDURE — 12400001 ZZH R&B MH UMMC

## 2019-05-12 ASSESSMENT — ACTIVITIES OF DAILY LIVING (ADL)
DRESS: STREET CLOTHES;INDEPENDENT
ORAL_HYGIENE: INDEPENDENT
HYGIENE/GROOMING: INDEPENDENT
HYGIENE/GROOMING: INDEPENDENT

## 2019-05-12 ASSESSMENT — MIFFLIN-ST. JEOR: SCORE: 1137.35

## 2019-05-12 NOTE — PROGRESS NOTES
Patient participates in groups, and she reported that she feels worried about not getting call back from her boyfriend. She denies suicidal ideations, hallucinations, depression, and anxiety. Patient rates her mood at six out of ten,  grades his sleep and appetite as good. She expects to be discharged next week Monday. Pt appears calm, pleasant, socially withdrawn, displays bright affect, likes Yoga, and follows directions.     05/11/19 2035   Behavioral Health   Hallucinations denies / not responding to hallucinations   Thinking distractable   Orientation person: oriented;place: oriented   Memory baseline memory   Insight admits / accepts   Judgement   (Good)   Eye Contact at examiner   Affect full range affect   Mood mood is calm   Physical Appearance/Attire appears stated age;attire appropriate to age and situation   Hygiene well groomed   Suicidality other (see comments)  (Pt denies)   1. Wish to be Dead No   2. Non-Specific Active Suicidal Thoughts  No   Self Injury other (see comment)  (Pt denies)   Elopement   (No concern)   Activity withdrawn   Speech clear;coherent   Medication Sensitivity no stated side effects;no observed side effects   Psychomotor / Gait balanced;steady   Coping/Psychosocial   Verbalized Emotional State acceptance;other (see comments)  (Worried)   Safety   Suicidality Status 15   Activities of Daily Living   Hygiene/Grooming independent   Oral Hygiene independent   Dress independent;street clothes   Room Organization independent   Activity   Activity Assistance Provided independent

## 2019-05-12 NOTE — PLAN OF CARE
"Patient has been visible in the milieu.  More subdued today--affect mostly flat.  Reports \"a lot \" of anxiety--related to probable discharge tomorrow and the uncertainty of her relationship with her \"boyfriend\", and where she will be living.  She is worried about relapse.  Denies SI/SIB or thoughts to hurt others.  Denies hallucinations.  Attending all unit programming today.  Visit with bio-mom this afternoon.    A: High anxiety related to probable discharge.    R: reinforce positive strengths.  Encourage NA meetings.  Reinforce DBT skills learned.  "

## 2019-05-12 NOTE — PROGRESS NOTES
Participated on the fringes of group in Music Therapy, with focus on mood elevation, validation and decreasing anxiety and improved group cohesiveness. Engaged and cooperative in music listening interventions.   Showed progress in session goals.

## 2019-05-13 VITALS
HEIGHT: 60 IN | SYSTOLIC BLOOD PRESSURE: 108 MMHG | WEIGHT: 101.6 LBS | OXYGEN SATURATION: 98 % | TEMPERATURE: 98.1 F | BODY MASS INDEX: 19.94 KG/M2 | RESPIRATION RATE: 16 BRPM | DIASTOLIC BLOOD PRESSURE: 80 MMHG | HEART RATE: 77 BPM

## 2019-05-13 PROCEDURE — 90832 PSYTX W PT 30 MINUTES: CPT

## 2019-05-13 PROCEDURE — H2032 ACTIVITY THERAPY, PER 15 MIN: HCPCS

## 2019-05-13 PROCEDURE — G0177 OPPS/PHP; TRAIN & EDUC SERV: HCPCS

## 2019-05-13 PROCEDURE — 99239 HOSP IP/OBS DSCHRG MGMT >30: CPT | Performed by: CLINICAL NURSE SPECIALIST
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
## 2019-05-13 NOTE — PROGRESS NOTES
05/12/19 2100   Therapeutic Recreation   Type of Intervention structured groups   Activity game   Response Participates with encouragement   Hours 0.5     Pt participated in Therapeutic Recreation group with focus on leisure participation and strategic cognitive reasoning.  Engaged and cooperative in group recreational intervention via a group game.  Pt remained focused and engaged for approximately half of the group. Pt initially was hesitant to participate, but joined in after some encouragement. Showed progress in session goals. Pt mood was calm.

## 2019-05-13 NOTE — PLAN OF CARE
"Patient up and out for morning routine. Affect relaxed and full range.  Social.    States she is ready to discharge--but \"nervous\".  Denies SI/SIB or thoughts to hurt others.  Denies hallucinations. Thinking is linear and organized,  Reports she will be staying with her mom until she can arrange other living plan.  Denies concerns regarding sleep, appetite, medication side effects.  "

## 2019-05-13 NOTE — PLAN OF CARE
OT General Care Plan    Pt attended 2 out of 2 OT groups offered. Pt actively participated in a structured occupational therapy group involving a self-reflecting, group leisure task. Pt appeared comfortable sharing positive past experiences and personal information with peers, and was respectful in listening and responding to peers. Pt actively participated in occupational therapy clinic. Pt was able to ask for assistance as needed, and independently initiate self-selected task. Pt demonstrated good focus, planning, and problem solving. Pt appeared comfortable interacting with peers and brightened upon social interaction.

## 2019-05-13 NOTE — DISCHARGE SUMMARY
"Psychiatric Discharge Summary    Lily San MRN# [id number]   Age: 23 year old YOB: 1995     Date of Admission:  5/4/2019  Date of Discharge:  5/13/2019  3:29 PM  Admitting Physician:  Aston James MD  Discharge Physician:  Debra A. Naegele, APRN CNS (Contact: [phone])         Event Leading to Hospitalization:   The patient is a 24yo female with a history of depression, PTSD and BPD who was admitted after endorsing a suicidal plan to overdose. The patient reports that she has been \"wanting to come in for a couple of months.\" Mood is depressed. Says that her SI \"comes and goes.\" Denies HI. Says that she has had a lot of \"distorted thinking\" and is hopeful to target her symptoms with therapy. Says that she will be occasionally paranoid but feels that this is mostly due to her relationship. Denies AH or VH. Does say that she sometimes will exercise excessively and has \"cut some things out of my diet.\" But overall feels that she is eating healthily. No purging. Hasn't been sleeping well but says her boyfriend will often stay up till 6am in the room that they share.            See Admission note by Aston James MD on 5/5/2019 for additional details.          DIagnoses:   PTSD (post-traumatic stress disorder)  History of BPD  Cannabis use disorder  History of polysubstance abuse       Labs:     Results for orders placed or performed during the hospital encounter of 05/04/19   Drug abuse screen 6 urine (tox)   Result Value Ref Range    Amphetamine Qual Urine Negative NEG^Negative    Barbiturates Qual Urine Negative NEG^Negative    Benzodiazepine Qual Urine Negative NEG^Negative    Cannabinoids Qual Urine Negative NEG^Negative    Cocaine Qual Urine Negative NEG^Negative    Ethanol Qual Urine Negative NEG^Negative    Opiates Qualitative Urine Negative NEG^Negative   HCG qualitative urine   Result Value Ref Range    HCG Qual Urine Negative NEG^Negative   CBC with platelets " differential   Result Value Ref Range    WBC 5.5 4.0 - 11.0 10e9/L    RBC Count 4.17 3.8 - 5.2 10e12/L    Hemoglobin 13.0 11.7 - 15.7 g/dL    Hematocrit 38.3 35.0 - 47.0 %    MCV 92 78 - 100 fl    MCH 31.2 26.5 - 33.0 pg    MCHC 33.9 31.5 - 36.5 g/dL    RDW 12.0 10.0 - 15.0 %    Platelet Count 232 150 - 450 10e9/L    Diff Method Automated Method     % Neutrophils 40.1 %    % Lymphocytes 51.6 %    % Monocytes 5.3 %    % Eosinophils 2.6 %    % Basophils 0.2 %    % Immature Granulocytes 0.2 %    Nucleated RBCs 0 0 /100    Absolute Neutrophil 2.2 1.6 - 8.3 10e9/L    Absolute Lymphocytes 2.8 0.8 - 5.3 10e9/L    Absolute Monocytes 0.3 0.0 - 1.3 10e9/L    Absolute Eosinophils 0.1 0.0 - 0.7 10e9/L    Absolute Basophils 0.0 0.0 - 0.2 10e9/L    Abs Immature Granulocytes 0.0 0 - 0.4 10e9/L    Absolute Nucleated RBC 0.0    Comprehensive metabolic panel   Result Value Ref Range    Sodium 142 133 - 144 mmol/L    Potassium 3.9 3.4 - 5.3 mmol/L    Chloride 108 94 - 109 mmol/L    Carbon Dioxide 28 20 - 32 mmol/L    Anion Gap 6 3 - 14 mmol/L    Glucose 89 70 - 99 mg/dL    Urea Nitrogen 13 7 - 30 mg/dL    Creatinine 0.88 0.52 - 1.04 mg/dL    GFR Estimate >90 >60 mL/min/[1.73_m2]    GFR Estimate If Black >90 >60 mL/min/[1.73_m2]    Calcium 8.5 8.5 - 10.1 mg/dL    Bilirubin Total 0.6 0.2 - 1.3 mg/dL    Albumin 3.6 3.4 - 5.0 g/dL    Protein Total 6.6 (L) 6.8 - 8.8 g/dL    Alkaline Phosphatase 50 40 - 150 U/L    ALT 29 0 - 50 U/L    AST 23 0 - 45 U/L   Lipid panel   Result Value Ref Range    Cholesterol 136 <200 mg/dL    Triglycerides 81 <150 mg/dL    HDL Cholesterol 71 >49 mg/dL    LDL Cholesterol Calculated 49 <100 mg/dL    Non HDL Cholesterol 65 <130 mg/dL   TSH with free T4 reflex and/or T3 as indicated   Result Value Ref Range    TSH 2.37 0.40 - 4.00 mU/L            Consults:   No consultations were requested during this admission         Hospital Course:   Lily San was admitted to [facility] with attending Benedict Mar  MD Fransisco as a voluntary patient. The patient was placed under status 15 (15 minute checks) to ensure patient safety.     Patient was admitted for increased depression and suicidal ideation. Medications were discussed with patient. She has trialed multiple antidepressants and does not feel medications have been helpful. Patient was interested in therapy and learning coping skills. Patient was encouraged to participate in therapeutic programming. Patients mood gradually improved and her suicidal ideation diminished.     Patient was educated on the detrimental effects of illicit substances and recommendation is to abstain from all mood altering substances.       Lily San did participate in groups and was visible in the milieu. The patient's symptoms of suicidal ideation improved. Patient demonstrated her coping skills while on the unit. She showed interest in DBT therapy and was also referred to Partial Hospitalization if she felt that she needed more structure in the community.     Patient has protective factors of seeking out treatment and supportive family. Patient no longer meets criteria for hospital level of care. She is at moderate risk of relapse due to her chemical dependency history.     Lily San was released to home. At the time of discharge Lily San was determined to not be a danger to herself or others.          Discharge Medications:     Current Discharge Medication List      CONTINUE these medications which have NOT CHANGED    Details   clindamycin (CLINDAMAX) 1 % external gel Apply topically 2 times daily      tretinoin (RETIN-A) 0.025 % external cream Apply topically At Bedtime      albuterol (PROAIR HFA, PROVENTIL HFA, VENTOLIN HFA) 108 (90 BASE) MCG/ACT inhaler Inhale 2 puffs into the lungs every 6 hours as needed for shortness of breath / dyspnea or wheezing  Qty: 1 Inhaler, Refills: 0    Associated Diagnoses: Intermittent asthma, with acute exacerbation       naloxone (NARCAN) nasal spray Spray 1 spray (4 mg) into one nostril alternating nostrils as needed for opioid reversal (every 2-3 minutes until medical assistance arrives.)  Qty: 0.2 mL, Refills: 1    Associated Diagnoses: Opioid overdose, accidental or unintentional, initial encounter (H)                  Psychiatric Examination:   Appearance:  awake, alert and adequately groomed  Attitude:  cooperative  Eye Contact:  good  Mood:  better  Affect:  appropriate and in normal range  Speech:  clear, coherent  Psychomotor Behavior:  no evidence of tardive dyskinesia, dystonia, or tics  Thought Process:  logical, linear and goal oriented  Associations:  no loose associations  Thought Content:  no evidence of suicidal ideation or homicidal ideation  Insight:  good  Judgment:  intact  Oriented to:  time, person, and place  Attention Span and Concentration:  intact  Recent and Remote Memory:  intact  Language: Able to name objects, Able to repeat phrases and Able to read and write  Fund of Knowledge: appropriate  Muscle Strength and Tone: normal  Gait and Station: Normal         Discharge Plan:   Behavioral Discharge Planning and Instructions        Summary:  You were admitted on 5/4/2019  due to Depression and Suicidal Ideations.  You were treated by Dr. Aston Diaz MD and Deb Naegele, APRN and discharged on 5 /13/2019 from [facility]         Principal Diagnosis:   PTSD (post-traumatic stress disorder)  History of BPD  Cannabis use disorder  History of polysubstance abuse        Health Care Follow-up Appointments:   Individual Therapy:   Date/Time: Wednesday, May 15th@10:00am     Provider: SUNG Bella  Address: [facility]- [address], [place] [address]  Phone: [phone]    Fax: [phone]     DBT Intake:  Date/Time: Tuesday, May 21, 2019 at 1:00pm  Provider: Radha  Address: [address] [address]  [phone]  Fax:  [phone]     Partial Hospitalization Program (PHP) Referral:   [facility]-Adult PHP  Call [phone] to schedule an intake appointment.      [facility]-Adult PHP  Depression and Mood Disorder's OR  Trauma Recovery  [place]: [phone]     AA Referral:   [facility] Intergroup Alcoholics  [email or web address]  CALL US: [phone]. PHONES ANSWERED 24 HOURS/DAY.     Walk-In Counseling:   Address: [address] [place] [address]   Phone: [phone]     Attend all scheduled appointments with your outpatient providers. Call at least 24 hours in advance if you need to reschedule an appointment to ensure continued access to your outpatient providers.   Major Treatments, Procedures and Findings:  You were provided with: a psychiatric assessment, assessed for medical stability, medication evaluation and/or management, group therapy, family therapy, individual therapy and milieu management     Symptoms to Report: feeling more aggressive, increased confusion, losing more sleep, mood getting worse or thoughts of suicide     Early warning signs can include: increased depression or anxiety sleep disturbances increased thoughts or behaviors of suicide or self-harm  increased unusual thinking, such as paranoia or hearing voices     Safety and Wellness:  Take all medicines as directed.  Make no changes unless your doctor suggests them.      Follow treatment recommendations.  Refrain from alcohol and non-prescribed drugs.  If there is a concern for safety, call [phone].     Resources:   Crisis Intervention: 355.221.5625 or 809-141-4347 (TTY: 748.998.3836).  Call anytime for help.  National [place] on Mental Illness (www.[place].org): 936.732.8061 or 215-066-1299.  Alcoholics Anonymous (www.alcoholics-anonymous.org): Check your phone book for your local chapter.  Suicide Awareness Voices of Education (SAVE) (www.save.org): 279-862-UPWZ (7260)  National Suicide  "Prevention Line (www.mentalhealthmn.org): 943-045-FMTI (9177)  Mental Health Consumer/Survivor Network of [place] (www.mhcsn.net): 757.255.8619 or 884-706-6977  Mental Health Association of [place] (www.mentalhealth.org): 768.570.8080 or 700-981-1166  Self- Management and Recovery Training., [employer]-- Toll free: 565.710.9742  www.LocalSort  [facility] Crisis Response [phone]  [facility] Crisis (COPE) Response - Adult [phone]  Text 4 Life: txt \"LIFE\" to 21950 for immediate support and crisis intervention  Crisis text line: Text \"[place]\" to 460165. Free, confidential, 24/7.  Crisis Intervention: 646.471.9422 or 736-716-7473. Call anytime for help.         The treatment team has appreciated the opportunity to work with you.     If you have any questions or concerns our unit number is [phone]       Attestation:  The patient has been seen and evaluated by me,  Debra A. Naegele, CARLY CNS on 5/13/2019  Discharge time > 30 minutes  "

## 2019-05-13 NOTE — DISCHARGE INSTRUCTIONS
Behavioral Discharge Planning and Instructions      Summary:  You were admitted on 5/4/2019  due to Depression and Suicidal Ideations.  You were treated by Dr. Aston Diaz MD and Deb Naegele, APRN and discharged on 5 /13/2019 from [facility]       Principal Diagnosis:   PTSD (post-traumatic stress disorder)  History of BPD  Cannabis use disorder  History of polysubstance abuse      Health Care Follow-up Appointments:   Individual Therapy:   Date/Time: Wednesday, May 15th@10:00am     Provider: SUNG Bella  Address: [facility]- [address], [place] [address]  Phone: [phone]    Fax: [phone]    DBT Intake:  Date/Time: Tuesday, May 21, 2019 at 1:00pm  Provider: Radha  Address: [facility] [address], [place] [id number]  [phone]  Fax: [phone]    Partial Hospitalization Program (PHP) Referral:   [facility]-Adult PHP  Call [phone] to schedule an intake appointment.     [facility]-Adult PHP  Depression and Mood Disorder's OR  Trauma Recovery  [place]: [phone]    AA Referral:   [facility]group Alcoholics  [email or web address]  CALL US: [phone]. PHONES ANSWERED 24 HOURS/DAY.    Walk-In Counseling:   Address: [facility] [place] [address]   Phone: [phone]    Attend all scheduled appointments with your outpatient providers. Call at least 24 hours in advance if you need to reschedule an appointment to ensure continued access to your outpatient providers.   Major Treatments, Procedures and Findings:  You were provided with: a psychiatric assessment, assessed for medical stability, medication evaluation and/or management, group therapy, family therapy, individual therapy and milieu management    Symptoms to Report: feeling more aggressive, increased confusion, losing more sleep, mood getting worse or thoughts of suicide    Early warning signs can include:  "increased depression or anxiety sleep disturbances increased thoughts or behaviors of suicide or self-harm  increased unusual thinking, such as paranoia or hearing voices    Safety and Wellness:  Take all medicines as directed.  Make no changes unless your doctor suggests them.      Follow treatment recommendations.  Refrain from alcohol and non-prescribed drugs.  If there is a concern for safety, call 911.    Resources:   Crisis Intervention: 592.546.6329 or 752-244-0517 (TTY: 905.698.5002).  Call anytime for help.  National [place] on Mental Illness (www.[place].org): 475.233.6283 or 899-563-5312.  Alcoholics Anonymous (www.alcoholics-anonymous.org): Check your phone book for your local chapter.  Suicide Awareness Voices of Education (SAVE) (www.save.org): 702-817-FBYF (3428)  National Suicide Prevention Line (www.mentalhealthmn.org): 746-168-DAXE (0403)  Mental Health Consumer/Survivor Network of [place] (www.mhcsn.net): 408.374.9933 or 647-301-6893  Mental Health Association of [place] (www.mentalhealth.org): 196.991.1137 or 928-032-4467  Self- Management and Recovery Training., [employer]-- Toll free: 857.711.9780  www.[employer].org  [facility] Crisis Response 094 171-9588  [facility] Crisis (COPE) Response - Adult 076 600-9296  Text 4 Life: txt \"LIFE\" to 75961 for immediate support and crisis intervention  Crisis text line: Text \"[place]\" to 204105. Free, confidential, 24/7.  Crisis Intervention: 277.726.7603 or 516-739-1628. Call anytime for help.       The treatment team has appreciated the opportunity to work with you.     If you have any questions or concerns our unit number is [phone]    "

## 2023-09-09 NOTE — DISCHARGE SUMMARY
EMERGENCY DEPARTMENT ENCOUNTER    Room Number:  15/15  PCP: Provider, No Known      HPI:  Chief Complaint: Fall  A complete HPI/ROS/PMH/PSH/SH/FH are unobtainable due to: None  Context: Jonathan Trejo is a 86 y.o. male who presents to the ED c/o fall.  Patient had a fall today.  Mechanical in nature as he lost his balance using his walker.  He states that he lightly hit his head.  He is complaining primarily of pain to his left buttocks.          PAST MEDICAL HISTORY  Active Ambulatory Problems     Diagnosis Date Noted    Benign prostatic hyperplasia with urinary obstruction 11/22/2015    Chronic renal impairment, stage 3 (moderate) 11/22/2015    Depression 11/22/2015    Gastroesophageal reflux disease with esophagitis 11/22/2015    Hyperlipidemia 11/22/2015    Nocturia 11/22/2015    Obesity (BMI 30-39.9) 11/22/2015    Osteopenia 11/22/2015    Osteoporosis 11/22/2015    Seborrhea 04/04/2016    Abnormal EKG 07/19/2016    Bradycardia 09/02/2016    Degenerative disc disease, cervical 10/02/2016    Typical atrial flutter 04/06/2017    Lumbar radiculopathy 06/06/2017    AVNRT (AV tarun re-entry tachycardia) 09/19/2017    S/P ablation of atrial flutter 09/19/2017    S/P catheter ablation of slow pathway 09/19/2017    Cardiomyopathy 09/19/2017    GARCIA (dyspnea on exertion) 09/19/2017    Urinary tract infection without hematuria 01/02/2018    Right foot drop 01/03/2018    Frequent falls 01/03/2018    Metabolic encephalopathy 01/03/2018    Acute renal failure 01/03/2018    Other secondary parkinsonism 03/12/2019    Moderate single current episode of major depressive disorder 03/12/2019    Mass of left lower leg 06/13/2019    Pulmonary emphysema 05/08/2020    Monoclonal gammopathy of unknown significance (MGUS) 08/04/2021    Myasthenia gravis without exacerbation 12/10/2021    Displacement of lumbar intervertebral disc without myelopathy 05/19/2022    Acute anemia 03/27/2023    Closed compression fracture of L3 lumbar vertebra,  Psychiatric Discharge Summary    Lily San MRN# [id number]   Age: 21 year old YOB: 1995     Date of Admission:  5/16/2017  Date of Discharge:  5/18/2017  2:34 PM  Admitting Physician:  Benedict Mar MD  Discharge Physician:  Debra Naegele APRN, CNS (Contact: [phone])         Event Leading to Hospitalization:   Lily San is a 21-year-old single female who is presenting with worsening suicidal thoughts. Patient reports that they became more intense in the last 3-4 days. She has stopped taking her medications, which include Cymbalta, Risperdal, and trazodone. She is also being treated with methadone 96 mg from [facility] in [place]. Patient reports that she is having thoughts of carbon monoxide poisoning. She has a history of a previous suicide attempt with carbon monoxide poisoning. She has had up to 20 hospitalizations. She has been in [facility] 2-3 times. Patient reports that she lives with her boyfriend in [place]. She reports being very sensitive to psychiatric medications and does not want her medications changed.            See Admission note by Debra Naegele APRN, CNS  found on 5/17/2017 for additional details.          DIagnoses:   1. Major depressive disorder, recurrent, moderate.   2. Borderline personality disorder.   3. Opioid use disorder severe.   4. Cannabis use disorder, severe.   5. Posttraumatic stress disorder.   6. Bulimia nervosa in remission  7. Urinary incontinence, unspecified type.          Labs:     Results for orders placed or performed during the hospital encounter of 05/16/17   Drug abuse screen 6 urine (tox)   Result Value Ref Range    Amphetamine Qual Urine  NEG     Negative   Cutoff for a negative amphetamine is 500 ng/mL or less.      Barbiturates Qual Urine  NEG     Negative   Cutoff for a negative barbiturate is 200 ng/mL or less.      Benzodiazepine Qual Urine  NEG     Negative   Cutoff for a negative benzodiazepine is  200 ng/mL or less.      Cannabinoids Qual Urine (A) NEG     Positive   Cutoff for a positive cannabinoid is greater than 50 ng/mL. This is an   unconfirmed screening result to be used for medical purposes only.      Cocaine Qual Urine  NEG     Negative   Cutoff for a negative cocaine is 300 ng/mL or less.      Ethanol Qual Urine  NEG     Negative   Cutoff for a negative urine ethanol is 0.05 g/dL or less      Opiates Qualitative Urine  NEG     Negative   Cutoff for a negative opiate is 300 ng/mL or less.     HCG qualitative urine   Result Value Ref Range    HCG Qual Urine Negative NEG   CBC with platelets   Result Value Ref Range    WBC 7.3 4.0 - 11.0 10e9/L    RBC Count 4.94 3.8 - 5.2 10e12/L    Hemoglobin 15.1 11.7 - 15.7 g/dL    Hematocrit 44.3 35.0 - 47.0 %    MCV 90 78 - 100 fl    MCH 30.6 26.5 - 33.0 pg    MCHC 34.1 31.5 - 36.5 g/dL    RDW 12.2 10.0 - 15.0 %    Platelet Count 326 150 - 450 10e9/L   Comprehensive metabolic panel   Result Value Ref Range    Sodium 141 133 - 144 mmol/L    Potassium 3.8 3.4 - 5.3 mmol/L    Chloride 107 94 - 109 mmol/L    Carbon Dioxide 26 20 - 32 mmol/L    Anion Gap 8 3 - 14 mmol/L    Glucose 95 70 - 99 mg/dL    Urea Nitrogen 8 7 - 30 mg/dL    Creatinine 0.79 0.52 - 1.04 mg/dL    GFR Estimate >90  Non  GFR Calc   >60 mL/min/1.7m2    GFR Estimate If Black >90   GFR Calc   >60 mL/min/1.7m2    Calcium 9.3 8.5 - 10.1 mg/dL    Bilirubin Total 0.4 0.2 - 1.3 mg/dL    Albumin 4.0 3.4 - 5.0 g/dL    Protein Total 7.8 6.8 - 8.8 g/dL    Alkaline Phosphatase 108 40 - 150 U/L    ALT 28 0 - 50 U/L    AST 22 0 - 45 U/L   Lipid panel   Result Value Ref Range    Cholesterol 185 <200 mg/dL    Triglycerides 202 (H) <150 mg/dL    HDL Cholesterol 43 (L) >49 mg/dL    LDL Cholesterol Calculated 102 (H) <100 mg/dL    Non HDL Cholesterol 142 (H) <130 mg/dL   TSH with free T4 reflex and/or T3 as indicated   Result Value Ref Range    TSH 3.24 0.40 - 4.00 mU/L             initial encounter 03/27/2023    Closed compression fracture of L4 lumbar vertebra, initial encounter 03/27/2023    Pulmonary embolus 07/13/2023     Resolved Ambulatory Problems     Diagnosis Date Noted    Pain of upper extremity 11/22/2015    Colon polyp 11/22/2015    Hearing loss 11/22/2015    Obesity (BMI 30-39.9) 11/22/2015    Acute on chronic systolic heart failure 09/02/2016     Past Medical History:   Diagnosis Date    Abnormal electrocardiogram     Arm pain     Atrial fibrillation     Atrial flutter     BPH (benign prostatic hyperplasia)     BPH associated with nocturia     Chronic kidney disease     Colon cancer     Dyspnea     Enlarged prostate without lower urinary tract symptoms (luts)     Episode of generalized weakness     Esophagitis, reflux     Fatigue     Fracture of ankle     GERD (gastroesophageal reflux disease)     Inguinal hernia     Loss of hearing     Obesity     Osteoarthritis cervical spine     Overweight     Tobacco dependence in remission     Urge incontinence of urine     Vitamin D deficiency     Wheezing          PAST SURGICAL HISTORY  Past Surgical History:   Procedure Laterality Date    CARDIAC CATHETERIZATION N/A 7/27/2017    Procedure: Valve assessment;  Surgeon: Adonis Solis MD;  Location: [facility] INVASIVE LOCATION;  Service:     CARDIAC CATHETERIZATION N/A 10/1/2018    Procedure: Left Heart Cath;  Surgeon: Bogdan Vargas MD;  Location: [facility] CATH INVASIVE LOCATION;  Service: Cardiology    CARDIAC CATHETERIZATION N/A 10/1/2018    Procedure: Coronary angiography;  Surgeon: Bogdan Vargas MD;  Location: [facility] CATH INVASIVE LOCATION;  Service: Cardiology    CARDIAC CATHETERIZATION N/A 10/1/2018    Procedure: Left ventriculography;  Surgeon: Bogdan Vargas MD;  Location: [facility] CATH INVASIVE LOCATION;  Service: Cardiology    CARDIAC CATHETERIZATION N/A 10/1/2018    Procedure: Right Heart Cath;  Surgeon: Bogdan Vargas MD;  Location: [facility] INVASIVE  Consults:   No consults this admission.          Hospital Course:   Lily San was admitted to Station 4A with Dr. Benedict Mar found as a voluntary patient. The patient was placed under status 15 (15 minute checks) to ensure patient safety.     Patient was admitted for suicidal ideation after stopping her medications for 3 to 4 days. Medications were re-started including Cymbalta, Risperdal and Trazodone. She declined any adjustment in her medications citing her sensitivity to medications. I verified [facility] [place] regarding her dose of methadone which is 96 mg. She has been taking this dose since Oct 2015.  She was given her maintenance dose during her hospital stay. She was counseled on the risk she is taking when she uses IV heroin while she is taking methadone. She was discharge with narcan and her boyfriend was given education on how to administer in case of overdose.  Patient attended groups. Patient reported the groups and staff were very helpful in lifting her mood and becoming hopeful again.     Lily San did participate in groups and was visible in the milieu. The patient's symptoms of suicidal ideation improved. Discussed with patient abstaining from all mood altering substances. Encouraged patient to attend NA groups in her area. Patient says that she has a friend she will go to meetings with her. Patient presented with a stable mood. She was much less anxious. She denies having any suicidal ideation or urges to use substances. Patient was future oriented . She has protective factors of supportive boyfriend and friend who will attend NA meetings with her as protective factors to mitigate her risk. She is at moderate risk of relapse due to her past psychiatric history.     Lily San was released to home. At the time of discharge Lily San was determined to not be a danger to herself or others.          Discharge Medications:  LOCATION;  Service: Cardiology    CARDIAC ELECTROPHYSIOLOGY PROCEDURE N/A 7/27/2017    Procedure: Ablation atrial flutter Will need to have 3 -4 weeks of therapeutic INR's ;  Surgeon: Adonis Solis MD;  Location:  [facility] CATH INVASIVE LOCATION;  Service:     CARDIOVERSION      CATARACT EXTRACTION W/ INTRAOCULAR LENS  IMPLANT, BILATERAL      COLON RESECTION      COLON SURGERY      polyp removed    COLONOSCOPY      07/15 redo 5 years  Segun    KNEE SURGERY      benign tumor removed, age 4    LASIK      LUMBAR EPIDURAL INJECTION N/A 5/25/2022    Procedure: LUMBAR EPIDURAL 1ST VISIT L3-4 (right of midline);  Surgeon: Bonnie Brown MD;  [place] [facility]  Service: Pain Management;  Laterality: N/A;    REFRACTIVE SURGERY  2007    REPAIR PERONEAL TENDONS ANKLE W/ FIBULAR OSTEOTOMY Right 03/2013    Dr. Banks         FAMILY HISTORY  Family History   Problem Relation Age of Onset    Breast cancer Mother     Heart disease Father     Hypertension Father     Heart attack Father     Hypertension Sister          SOCIAL HISTORY  Social History     Socioeconomic History    Marital status:     Number of children: 2    Years of education: COLLEGE GRADUATE   Tobacco Use    Smoking status: Former     Packs/day: 0.50     Years: 55.00     Pack years: 27.50     Types: Cigarettes     Quit date: 5/1/2008     Years since quitting: 15.3    Smokeless tobacco: Never    Tobacco comments:     QUIT 10 YRS   Vaping Use    Vaping Use: Never used   Substance and Sexual Activity    Alcohol use: Not Currently     Comment: SOCIALLY    Drug use: No    Sexual activity: Defer         ALLERGIES  Patient has no known allergies.        REVIEW OF SYSTEMS  Review of Systems     All systems reviewed and negative except for those discussed in HPI.       PHYSICAL EXAM  ED Triage Vitals [09/09/23 0846]   Temp Heart Rate Resp BP SpO2   98.3 °F (36.8 °C) 78 17 123/70 100 %      Temp src Heart Rate Source Patient Position BP Location FiO2 (%)      Discharge Medication List as of 5/18/2017  2:34 PM      START taking these medications    Details   naloxone (NARCAN) nasal spray Spray 1 spray (4 mg) into one nostril alternating nostrils as needed for opioid reversal (every 2-3 minutes until medical assistance arrives.), Disp-0.2 mL, R-1, Local Print         CONTINUE these medications which have CHANGED    Details   DULoxetine (CYMBALTA) 30 MG EC capsule Take 1 capsule (30 mg) by mouth 2 times daily, Disp-60 capsule, R-1, E-Prescribe      risperiDONE (RISPERDAL) 1 MG tablet Take 1 tablet (1 mg) by mouth At Bedtime, Disp-60 tablet, R-1, E-Prescribe         CONTINUE these medications which have NOT CHANGED    Details   ibuprofen (ADVIL/MOTRIN) 200 MG tablet Take 400-600 mg by mouth every 4 hours as needed for mild pain, Historical      methadone (DOLPHINE) 5 MG/5ML solution Take 90 mLs (90 mg) by mouth daily, R-0, No Print Out      fluticasone (FLOVENT DISKUS) 100 MCG/BLIST AEPB Inhale 1 puff (100 mcg) into the lungs 2 times daily, Disp-60 each, R-1, E-Prescribe      traZODone (DESYREL) 100 MG tablet Take 2 tablets (200 mg) by mouth At Bedtime, Disp-60 tablet, R-1, E-Prescribe      oxybutynin (DITROPAN-XL) 5 MG 24 hr tablet Take 1 tablet (5 mg) by mouth At Bedtime, Disp-30 tablet, R-1, E-Prescribe      albuterol (PROAIR HFA, PROVENTIL HFA, VENTOLIN HFA) 108 (90 BASE) MCG/ACT inhaler Inhale 2 puffs into the lungs every 6 hours as needed for shortness of breath / dyspnea or wheezing, Disp-1 Inhaler, R-0, E-Prescribe                  Psychiatric Examination:   Appearance:  awake, alert and adequately groomed  Attitude:  cooperative  Eye Contact:  good  Mood:  good  Affect:  appropriate and in normal range  Speech:  clear, coherent  Psychomotor Behavior:  no evidence of tardive dyskinesia, dystonia, or tics  Thought Process:  logical, linear and goal oriented  Associations:  no loose associations  Thought Content:  no evidence of suicidal ideation or homicidal    Tympanic -- -- -- --       Physical Exam      GENERAL: no acute distress  HENT: nares patent  EYES: no scleral icterus  CV: regular rhythm, normal rate  RESPIRATORY: normal effort  ABDOMEN: soft, nontender  MUSCULOSKELETAL: no deformity, no C/T/L-spine tenderness, no palpation or passive range of motion of the 4 extremities, no chest wall tenderness, pelvis is stable  NEURO: alert, moves all extremities, follows commands  PSYCH:  calm, cooperative  SKIN: warm, dry, scattered ecchymosis to the lower legs bilaterally    Vital signs and nursing notes reviewed.      LAB RESULTS  No results found for this or any previous visit (from the past 24 hour(s)).    Ordered the above labs and reviewed the results.        RADIOLOGY  CT Head Without Contrast    Result Date: 9/8/2023  CT HEAD WITHOUT CONTRAST  CLINICAL HISTORY: Fall today hitting the back of the head.  TECHNIQUE: CT scan of the head was obtained with 3 mm axial soft tissue and 2 mm axial bone algorithm algorithm images. No intravenous contrast was administered. Sagittal and coronal reconstructions were obtained.  COMPARISON: CT head dated 07/04/2023.  FINDINGS:   There is no evidence for a calvarial fracture. There is no evidence for an acute extra-axial hemorrhage.  The ventricles, sulci, and cisterns are age-appropriate. The basal ganglia and thalami are unremarkable in appearance. There are moderate changes of chronic small vessel ischemic phenomenon. The posterior fossa structures are within normal limits. Atherosclerotic calcifications are incidentally appreciated within the intracranial vasculature.       No CT evidence for acute traumatic intracranial pathology.   This report was finalized on 9/8/2023 1:58 PM by Dr. Harry Danielle M.D.       Ordered the above noted radiological studies. Reviewed by me in PACS.          PROCEDURES  Procedures        MEDICATIONS GIVEN IN ER  Medications   acetaminophen (TYLENOL) tablet 1,000 mg (1,000 mg Oral Given 9/9/23  0903)         MEDICAL DECISION MAKING, PROGRESS, and CONSULTS    Discussion below represents my analysis of pertinent findings related to patient's condition, differential diagnosis, treatment plan and final disposition.      Orders placed during this visit:  Orders Placed This Encounter   Procedures    CT Head Without Contrast    CT Pelvis Without Contrast         Additional sources:  - Discussed/obtained information from independent historians:  family at bedside   Additional information was obtained to confirm the patient's history.          Differential diagnosis:    Intracranial hemorrhage, fracture, hematoma, infectious process causing increased falls        Independent interpretation of labs, radiology studies, and discussions with consultants:  ED Course as of 09/11/23 1050   Sat Sep 09, 2023   1139 WBC(!): 17.71 [TD]   1140 Hemoglobin(!): 12.7 [TD]   1236 Leukocytes, UA(!): Trace [TD]   1236 Nitrite, UA: Negative [TD]   1245 WBC, UA(!): 3-5 [TD]   1245 Bacteria, UA: None Seen [TD]      ED Course User Index  [TD] Alex Arellano II, MD       I discussed the case with Dr. Romeo, hospitalist. We reviewed labs and imaging. He will admit.        DIAGNOSIS  Final diagnoses:   Sepsis, due to unspecified organism, unspecified whether acute organ dysfunction present   Frequent falls   Hematoma         DISPOSITION  Admit      Latest Documented Vital Signs:  As of 09:05 EDT  BP- 123/70 HR- 66 Temp- 98.3 °F (36.8 °C) (Tympanic) O2 sat- 97%      --    Please note that portions of this were completed with a voice recognition program.       Note Disclaimer: At [facility], we believe that sharing information builds trust and better relationships. You are receiving this note because you are receiving care at [facility] or recently visited. It is possible you will see health information before a provider has talked with you about it. This kind of information can be easy to misunderstand. To help you fully  ideation  Insight:  limited  Judgment:  limited  Oriented to:  time, person, and place  Attention Span and Concentration:  intact  Recent and Remote Memory:  intact  Language: Able to name objects, Able to repeat phrases and Able to read and write  Fund of Knowledge: low-normal  Muscle Strength and Tone: normal  Gait and Station: Normal         Discharge Plan:     Summary:  You were admitted on 5/16/2017 For Suicidal Ideations.  You were treated by Debra Naegele, APRN, CNS and discharged on 05/18/2017 from Station 4[facility].     Main Diagnosis:   1. Major depressive disorder, recurrent, moderate.   2. Borderline personality disorder.   3. Opioid use disorder severe.   4. Cannabis use disorder, severe.   5. Posttraumatic stress disorder.   6. Bulimia nervosa in remission  7. Urinary incontinence, unspecified type     Health Care Follow-up Appointments:   Medication Management  Provider: [facility] [address], [address] [place] [address]  Phone: [phone] Fax: [phone]  Appointment: Tuesday, June 6 at 2:30 pm     No therapy appointment was set up because you declined therapy services.     Attend all scheduled appointments with your outpatient providers. Call at least 24 hours in advance if you need to reschedule an appointment to ensure continued access to your outpatient providers.   Major Treatments, Procedures and Findings: You were provided with: a psychiatric assessment, medication evaluation and/or management and milieu management     Symptoms to Report: feeling more aggressive, increased confusion, losing more sleep, mood getting worse or thoughts of suicide     Early warning signs can include: increased depression or anxiety sleep disturbances increased thoughts or behaviors of suicide or self-harm increased unusual thinking, such as paranoia or hearing voices     Safety and Wellness: Take all medicines as directed. Make no changes unless your doctor suggests  them. Follow treatment recommendations. Refrain from alcohol and non-prescribed drugs. If there is a concern for safety, call 911.     Resources:   Crisis Intervention: 316.841.6013 or 185-372-5713 (TTY: 315.256.6533). Call anytime for help.  National [place] on Mental Illness (www.[place].org): 554.185.8566 or 169-695-4629.  [place] Association for Children's Mental Health (www.mac.org): 527.290.9521.  Suicide Awareness Voices of Education (SAVE) (www.save.org): 012-936-NUBD (6883)  National Suicide Prevention Line (www.mentalhealthmn.org): 199-418-BRYW (8414)  Mental Health Consumer/Survivor Network of [place] (www.mhcsn.net): 661.566.4493 or 540-409-4381  Mental Health Association of [place] (www.mentalhealth.org): 519.118.2141 or 554-429-6923     The treatment team has appreciated the opportunity to work with you.   If you have any questions or concerns our unit number is [phone]  You may be receiving a follow-up phone call within the next three days from a representative from behavioral health.   You have identified the best phone number to reach you as [phone]                  Attestation:  The patient has been seen and evaluated by me,  Debra Naegele APRN, CNS on 5/18/2017  Discharge time > 30 minutes   understand what it means for your health, we urge you to discuss this note with your provider.         Alex Arellano II, MD  09/11/23 0169